# Patient Record
Sex: MALE | Race: WHITE | NOT HISPANIC OR LATINO | Employment: FULL TIME | URBAN - METROPOLITAN AREA
[De-identification: names, ages, dates, MRNs, and addresses within clinical notes are randomized per-mention and may not be internally consistent; named-entity substitution may affect disease eponyms.]

---

## 2017-04-05 DIAGNOSIS — C83.30 DIFFUSE LARGE B-CELL LYMPHOMA, UNSPECIFIED BODY REGION (HCC): Primary | ICD-10-CM

## 2017-04-10 ENCOUNTER — OFFICE VISIT (OUTPATIENT)
Dept: ONCOLOGY | Facility: CLINIC | Age: 57
End: 2017-04-10

## 2017-04-10 ENCOUNTER — LAB (OUTPATIENT)
Dept: OTHER | Facility: HOSPITAL | Age: 57
End: 2017-04-10

## 2017-04-10 VITALS
DIASTOLIC BLOOD PRESSURE: 66 MMHG | HEART RATE: 77 BPM | WEIGHT: 205.4 LBS | TEMPERATURE: 97.3 F | SYSTOLIC BLOOD PRESSURE: 105 MMHG | RESPIRATION RATE: 16 BRPM | HEIGHT: 73 IN | OXYGEN SATURATION: 97 % | BODY MASS INDEX: 27.22 KG/M2

## 2017-04-10 DIAGNOSIS — C83.32 DIFFUSE LARGE B-CELL LYMPHOMA OF INTRATHORACIC LYMPH NODES (HCC): Primary | ICD-10-CM

## 2017-04-10 DIAGNOSIS — C83.30 DIFFUSE LARGE B-CELL LYMPHOMA, UNSPECIFIED BODY REGION (HCC): Primary | ICD-10-CM

## 2017-04-10 DIAGNOSIS — Z92.3 HISTORY OF RADIATION THERAPY: ICD-10-CM

## 2017-04-10 LAB
ALBUMIN SERPL-MCNC: 4.8 G/DL (ref 3.5–5.2)
ALBUMIN/GLOB SERPL: 1.8 G/DL
ALP SERPL-CCNC: 73 U/L (ref 39–117)
ALT SERPL W P-5'-P-CCNC: 35 U/L (ref 1–41)
ANION GAP SERPL CALCULATED.3IONS-SCNC: 10.4 MMOL/L
AST SERPL-CCNC: 30 U/L (ref 1–40)
BASOPHILS # BLD AUTO: 0.05 10*3/MM3 (ref 0–0.2)
BASOPHILS NFR BLD AUTO: 0.9 % (ref 0–1.5)
BILIRUB SERPL-MCNC: 0.5 MG/DL (ref 0.1–1.2)
BUN BLD-MCNC: 15 MG/DL (ref 6–20)
BUN/CREAT SERPL: 16 (ref 7–25)
CALCIUM SPEC-SCNC: 9.6 MG/DL (ref 8.6–10.5)
CHLORIDE SERPL-SCNC: 103 MMOL/L (ref 98–107)
CO2 SERPL-SCNC: 27.6 MMOL/L (ref 22–29)
CREAT BLD-MCNC: 0.94 MG/DL (ref 0.76–1.27)
DEPRECATED RDW RBC AUTO: 41.4 FL (ref 37–54)
EOSINOPHIL # BLD AUTO: 0.11 10*3/MM3 (ref 0–0.7)
EOSINOPHIL NFR BLD AUTO: 2 % (ref 0.3–6.2)
ERYTHROCYTE [DISTWIDTH] IN BLOOD BY AUTOMATED COUNT: 11.9 % (ref 11.5–14.5)
GFR SERPL CREATININE-BSD FRML MDRD: 83 ML/MIN/1.73
GLOBULIN UR ELPH-MCNC: 2.6 GM/DL
GLUCOSE BLD-MCNC: 142 MG/DL (ref 65–99)
HCT VFR BLD AUTO: 45 % (ref 40.4–52.2)
HGB BLD-MCNC: 15.3 G/DL (ref 13.7–17.6)
HOLD SPECIMEN: NORMAL
IMM GRANULOCYTES # BLD: 0.02 10*3/MM3 (ref 0–0.03)
IMM GRANULOCYTES NFR BLD: 0.4 % (ref 0–0.5)
LDH SERPL-CCNC: 145 U/L (ref 135–225)
LYMPHOCYTES # BLD AUTO: 1.65 10*3/MM3 (ref 0.9–4.8)
LYMPHOCYTES NFR BLD AUTO: 30.3 % (ref 19.6–45.3)
MCH RBC QN AUTO: 31.8 PG (ref 27–32.7)
MCHC RBC AUTO-ENTMCNC: 34 G/DL (ref 32.6–36.4)
MCV RBC AUTO: 93.6 FL (ref 79.8–96.2)
MONOCYTES # BLD AUTO: 0.48 10*3/MM3 (ref 0.2–1.2)
MONOCYTES NFR BLD AUTO: 8.8 % (ref 5–12)
NEUTROPHILS # BLD AUTO: 3.13 10*3/MM3 (ref 1.9–8.1)
NEUTROPHILS NFR BLD AUTO: 57.6 % (ref 42.7–76)
NRBC BLD MANUAL-RTO: 0 /100 WBC (ref 0–0)
PLATELET # BLD AUTO: 228 10*3/MM3 (ref 140–500)
PMV BLD AUTO: 9.6 FL (ref 6–12)
POTASSIUM BLD-SCNC: 4.1 MMOL/L (ref 3.5–5.2)
PROT SERPL-MCNC: 7.4 G/DL (ref 6–8.5)
RBC # BLD AUTO: 4.81 10*6/MM3 (ref 4.6–6)
SODIUM BLD-SCNC: 141 MMOL/L (ref 136–145)
WBC NRBC COR # BLD: 5.44 10*3/MM3 (ref 4.5–10.7)

## 2017-04-10 PROCEDURE — 80053 COMPREHEN METABOLIC PANEL: CPT | Performed by: INTERNAL MEDICINE

## 2017-04-10 PROCEDURE — 85025 COMPLETE CBC W/AUTO DIFF WBC: CPT | Performed by: INTERNAL MEDICINE

## 2017-04-10 PROCEDURE — 99213 OFFICE O/P EST LOW 20 MIN: CPT | Performed by: INTERNAL MEDICINE

## 2017-04-10 PROCEDURE — 36415 COLL VENOUS BLD VENIPUNCTURE: CPT

## 2017-04-10 PROCEDURE — 83615 LACTATE (LD) (LDH) ENZYME: CPT | Performed by: INTERNAL MEDICINE

## 2017-04-10 NOTE — PROGRESS NOTES
Subjective .     REASONS FOR FOLLOWUP:    Primary mediastinal large B cell non-Hodgkin's lymphoma status post CHOP/Rituxan chemotherapy x six cycles, completed January 2005, with subsequent consolidative mediastinal radiation therapy.      HISTORY OF PRESENT ILLNESS:  The patient is a 57 y.o. year old male who is here for follow-up with the above-mentioned history.    History of Present Illness   patient returns today for an annual follow-up visit with laboratory studies to review.  In the interval, he has had no significant complaints.  He denies any fevers night sweats.  He reports no appetite.  He remains very active.  He denies any chest pain, dyspnea on exertion.    PAST MEDICAL HISTORY:  Hyperlipidemia.     ONCOLOGIC HISTORY:  (History from previous dates can be found in the separate document.)    Current Outpatient Prescriptions on File Prior to Visit   Medication Sig Dispense Refill   • rosuvastatin (CRESTOR) 10 MG tablet Take 10 mg by mouth daily.       No current facility-administered medications on file prior to visit.        ALLERGIES:   No Known Allergies    SOCIAL HISTORY:    MARITAL HISTORY:  , living with spouse. Three children  OCCUPATION:   .  TOBACCO USE:   Has no significant smoking history.  ALCOHOL:   Does not give any significant history of alcohol usage.    FAMILY HISTORY:  Noncontributory.  Patient has one brother. Father is a retired surgeon.          Review of Systems   Constitutional: Negative for activity change, appetite change, fatigue, fever and unexpected weight change.   HENT: Negative for congestion, mouth sores, nosebleeds, sore throat and voice change.    Respiratory: Negative for cough, shortness of breath and wheezing.    Cardiovascular: Negative for chest pain, palpitations and leg swelling.   Gastrointestinal: Negative for abdominal distention, abdominal pain, blood in stool, constipation, diarrhea, nausea and vomiting.   Endocrine: Negative for cold  intolerance and heat intolerance.   Genitourinary: Negative for difficulty urinating, dysuria, frequency and hematuria.   Musculoskeletal: Negative for arthralgias, back pain, joint swelling and myalgias.   Skin: Negative for rash.   Neurological: Negative for dizziness, syncope, weakness, light-headedness, numbness and headaches.   Hematological: Negative for adenopathy. Does not bruise/bleed easily.   Psychiatric/Behavioral: Negative for confusion and sleep disturbance. The patient is not nervous/anxious.          Objective      Vitals:    04/10/17 1000   BP: 105/66   Pulse: 77   Resp: 16   Temp: 97.3 °F (36.3 °C)   SpO2: 97%      Current Status 4/10/2017   ECOG score 0   Pain 0/10    Physical Exam   Constitutional: He is oriented to person, place, and time. He appears well-developed and well-nourished.   HENT:   Mouth/Throat: Oropharynx is clear and moist.   Eyes: Conjunctivae are normal.   Neck: No thyromegaly present.   Cardiovascular: Normal rate and regular rhythm.  Exam reveals no gallop and no friction rub.    No murmur heard.  Pulmonary/Chest: Breath sounds normal. No respiratory distress.   Abdominal: Soft. Bowel sounds are normal. He exhibits no distension. There is no tenderness.   Musculoskeletal: He exhibits no edema.   Lymphadenopathy:        Head (right side): No submandibular adenopathy present.     He has no cervical adenopathy.     He has no axillary adenopathy.        Right: No inguinal and no supraclavicular adenopathy present.        Left: No inguinal and no supraclavicular adenopathy present.   Neurological: He is alert and oriented to person, place, and time. He displays normal reflexes. No cranial nerve deficit. He exhibits normal muscle tone.   Skin: Skin is warm and dry. No rash noted.   Psychiatric: He has a normal mood and affect. His behavior is normal.       RECENT LABS:  Hematology WBC   Date Value Ref Range Status   04/10/2017 5.44 4.50 - 10.70 10*3/mm3 Final     RBC   Date Value  Ref Range Status   04/10/2017 4.81 4.60 - 6.00 10*6/mm3 Final     Hemoglobin   Date Value Ref Range Status   04/10/2017 15.3 13.7 - 17.6 g/dL Final     Hematocrit   Date Value Ref Range Status   04/10/2017 45.0 40.4 - 52.2 % Final     Platelets   Date Value Ref Range Status   04/10/2017 228 140 - 500 10*3/mm3 Final        Lab Results   Component Value Date    GLUCOSE 142 (H) 04/10/2017    BUN 15 04/10/2017    CREATININE 0.94 04/10/2017    EGFRIFNONA 83 04/10/2017    BCR 16.0 04/10/2017    K 4.1 04/10/2017    CO2 27.6 04/10/2017    CALCIUM 9.6 04/10/2017    ALBUMIN 4.80 04/10/2017    LABIL2 1.8 04/10/2017    AST 30 04/10/2017    ALT 35 04/10/2017           Assessment/Plan     1. Primary mediastinal large B cell non-Hodgkin lymphoma: The patient was diagnosed in 2004 and was treated with CHOP/Rituxan x6 cycles and subsequently received consolidative radiation therapy with treatment completed in early 2005. He is therefore 12 years out from completion of therapy. He has had no evidence of recurrence. We did discuss again today the reason for continued annual followup as to monitor for potential late term effects from his treatment, particularly radiation therapy to the chest. He clinically is asymptomatic at this time. We previously discussed changing followup chest x-ray to every other year.  Chest x-ray was performed in 2016 and therefore he will be due for this next year when he returns.  We did discuss the potential increased risk for premature coronary artery disease.  He did undergo a stress test at least 5 or so years ago due to some atypical chest pain and this apparently was a negative study.  We will go ahead and refer him back to cardiology to discuss any potential need for repeat stress test.   2. Health maintenance: The patient did undergo a screening colonoscopy in December 2015 with a negative result.  Follow-up will be anticipated at a 10 year interval.     PLAN:      1. Refer to cardiology for  consideration of need for repeat stress testing due to previous thoracic irradiation.  2. Return here in 1 year for an MD visit with CBC, CMP, LDH, as well as chest x-ray.

## 2017-04-20 ENCOUNTER — OFFICE VISIT (OUTPATIENT)
Dept: CARDIOLOGY | Facility: CLINIC | Age: 57
End: 2017-04-20

## 2017-04-20 VITALS
SYSTOLIC BLOOD PRESSURE: 118 MMHG | BODY MASS INDEX: 27.3 KG/M2 | WEIGHT: 206 LBS | HEART RATE: 75 BPM | HEIGHT: 73 IN | DIASTOLIC BLOOD PRESSURE: 74 MMHG

## 2017-04-20 DIAGNOSIS — R06.02 SHORTNESS OF BREATH: ICD-10-CM

## 2017-04-20 DIAGNOSIS — IMO0002 RADIATION EXPOSURE: Primary | ICD-10-CM

## 2017-04-20 PROCEDURE — 99204 OFFICE O/P NEW MOD 45 MIN: CPT | Performed by: INTERNAL MEDICINE

## 2017-04-20 PROCEDURE — 93000 ELECTROCARDIOGRAM COMPLETE: CPT | Performed by: INTERNAL MEDICINE

## 2017-04-20 RX ORDER — ASPIRIN 81 MG/1
81 TABLET ORAL DAILY
COMMUNITY
End: 2018-05-04

## 2017-04-20 NOTE — PROGRESS NOTES
Date of Office Visit: 2017  Encounter Provider: Daryl Crisostomo MD  Place of Service: [facility] CARDIOLOGY  Patient Name: Franklin Da Silva  :1960    Chief complaint: Prior mediastinal and chest radiation exposure for NHL.    History of Present Illness:    Dear Dr. Nolan:    I had the pleasure of seeing your patient in cardiology office on 2017.  As you well   know, he is a very pleasant 57-year-old male with a past medical history significant for   prior non-Hodgkin's lymphoma of the chest and mediastinum who presents for   evaluation.  The patient was diagnosed with diffuse large B cell non-Hodgkin's   lymphoma in , and underwent chemotherapy.  He subsequently underwent   extensive mediastinal and chest radiation in .  He has been referred for evaluation   based on his prior radiation exposure to his chest.  On direct question, he only has   shortness of breath with heavy exertion, but states that this has not changed.  He   actually works out routinely, without any difficulty.  He has had no chest pain.  He did   have a stress echocardiogram in  which showed no evidence of constriction, and   no significant valvular disease.  His ejection fraction was 50-55% of the time.  His EKG   from today is normal other than a first-degree AV block.    Past Medical History:   Diagnosis Date   • History of radiation therapy     To mediastinum    • Hyperlipidemia    • NHL (non-Hodgkin's lymphoma)     Diffuse large B cell       Past Surgical History:   Procedure Laterality Date   • APPENDECTOMY      As a child   • COLONOSCOPY  2015    Screening, negative/[facility]       Current Outpatient Prescriptions on File Prior to Visit   Medication Sig Dispense Refill   • rosuvastatin (CRESTOR) 10 MG tablet Take 10 mg by mouth daily.       No current facility-administered medications on file prior to visit.      Allergies as of 2017   • (No Known  "Allergies)     Social History     Social History   • Marital status:      Spouse name: Conchita   • Number of children: 3   • Years of education: College     Occupational History   • [employer]     Social History Main Topics   • Smoking status: Never Smoker   • Smokeless tobacco: Never Used   • Alcohol use Yes      Comment: Socially    • Drug use: No   • Sexual activity: Not on file     Other Topics Concern   • Not on file     Social History Narrative     Family History   Problem Relation Age of Onset   • Coronary artery disease Father      Father with CABG in mid 70's   • Heart disease Father      Father with pacemaker        Review of Systems   All other systems reviewed and are negative.    Objective:     Vitals:    04/20/17 0853   BP: 118/74   Pulse: 75   Weight: 206 lb (93.4 kg)   Height: 73\" (185.4 cm)     Body mass index is 27.18 kg/(m^2).    Physical Exam   Constitutional: He is oriented to person, place, and time. He appears well-developed and well-nourished.   HENT:   Head: Normocephalic and atraumatic.   Eyes: Conjunctivae are normal.   Neck: Neck supple.   Cardiovascular: Normal rate and regular rhythm.  Exam reveals no gallop and no friction rub.    No murmur heard.  Pulmonary/Chest: Effort normal and breath sounds normal.   Abdominal: Soft. There is no tenderness.   Musculoskeletal: He exhibits no edema.   Neurological: He is alert and oriented to person, place, and time.   Skin: Skin is warm.   Psychiatric: He has a normal mood and affect. His behavior is normal.     Lab Review:     ECG 12 Lead  Date/Time: 4/20/2017 8:54 AM  Performed by: GAYATRI MOORE  Authorized by: GAYATRI MOORE   Comparison: not compared with previous ECG   Previous ECG: no previous ECG available  Rhythm: sinus rhythm  Rate: normal  BPM: 75  Conduction: 1st degree  Clinical impression: non-specific ECG            Assessment:       Diagnosis Plan   1. Radiation exposure  Adult Transthoracic Echo " Complete   2. Shortness of breath  Adult Transthoracic Echo Complete     Plan:       As noted, the patient did have radiation therapy to his chest and mediastinum for   non-Hodgkin's lymphoma in 2005.  I explained to him that the average time.  For   radiation-induced heart disease to manifest is around 20 years post radiation.  I   did also discuss with him the possibilities of radiation induced heart disease,   including valvular disease, restrictive cardiomyopathy, constrictive pericarditis,   and premature coronary artery disease.  He is not having any significant exertional   symptoms other than shortness of breath with heavy exertion.  He has had no   chest pain.  I do not feel that he needs an ischemic work-up at this time.  However,   I would recommend checking an echocardiogram to evaluate his ventricular   function, diastolic function, and valvular anatomy.  I do also suggested he get an   echocardiogram every 2-3 years to reevaluate this (at least until he is 20 years   out from his radiation).  Further plans will be made pending the results of the   echocardiogram.

## 2017-05-01 ENCOUNTER — HOSPITAL ENCOUNTER (OUTPATIENT)
Dept: CARDIOLOGY | Facility: HOSPITAL | Age: 57
Discharge: HOME | End: 2017-05-01
Attending: INTERNAL MEDICINE | Admitting: INTERNAL MEDICINE

## 2017-05-01 VITALS
HEART RATE: 79 BPM | WEIGHT: 200 LBS | BODY MASS INDEX: 27.09 KG/M2 | SYSTOLIC BLOOD PRESSURE: 114 MMHG | DIASTOLIC BLOOD PRESSURE: 52 MMHG | HEIGHT: 72 IN

## 2017-05-01 DIAGNOSIS — R06.02 SHORTNESS OF BREATH: ICD-10-CM

## 2017-05-01 DIAGNOSIS — IMO0002 RADIATION EXPOSURE: ICD-10-CM

## 2017-05-01 LAB
BH CV ECHO MEAS - ACS: 2 CM
BH CV ECHO MEAS - AO MAX PG (FULL): 5.6 MMHG
BH CV ECHO MEAS - AO MAX PG: 8.8 MMHG
BH CV ECHO MEAS - AO MEAN PG (FULL): 2.4 MMHG
BH CV ECHO MEAS - AO MEAN PG: 4.1 MMHG
BH CV ECHO MEAS - AO ROOT AREA (BSA CORRECTED): 1.7
BH CV ECHO MEAS - AO ROOT AREA: 10.2 CM^2
BH CV ECHO MEAS - AO ROOT DIAM: 3.6 CM
BH CV ECHO MEAS - AO V2 MAX: 148 CM/SEC
BH CV ECHO MEAS - AO V2 MEAN: 92.6 CM/SEC
BH CV ECHO MEAS - AO V2 VTI: 28.4 CM
BH CV ECHO MEAS - AVA(I,A): 2.8 CM^2
BH CV ECHO MEAS - AVA(I,D): 2.8 CM^2
BH CV ECHO MEAS - AVA(V,A): 2.6 CM^2
BH CV ECHO MEAS - AVA(V,D): 2.6 CM^2
BH CV ECHO MEAS - BSA(HAYCOCK): 2.2 M^2
BH CV ECHO MEAS - BSA: 2.1 M^2
BH CV ECHO MEAS - BZI_BMI: 27.1 KILOGRAMS/M^2
BH CV ECHO MEAS - BZI_METRIC_HEIGHT: 182.9 CM
BH CV ECHO MEAS - BZI_METRIC_WEIGHT: 90.7 KG
BH CV ECHO MEAS - CONTRAST EF (2CH): 53.1 ML/M^2
BH CV ECHO MEAS - CONTRAST EF 4CH: 53.2 ML/M^2
BH CV ECHO MEAS - EDV(MOD-SP2): 96 ML
BH CV ECHO MEAS - EDV(MOD-SP4): 94 ML
BH CV ECHO MEAS - EDV(TEICH): 103.6 ML
BH CV ECHO MEAS - EF(CUBED): 76.4 %
BH CV ECHO MEAS - EF(MOD-SP2): 53.1 %
BH CV ECHO MEAS - EF(MOD-SP4): 53.2 %
BH CV ECHO MEAS - EF(TEICH): 68.4 %
BH CV ECHO MEAS - ESV(MOD-SP2): 45 ML
BH CV ECHO MEAS - ESV(MOD-SP4): 44 ML
BH CV ECHO MEAS - ESV(TEICH): 32.7 ML
BH CV ECHO MEAS - FS: 38.2 %
BH CV ECHO MEAS - IVS/LVPW: 0.81
BH CV ECHO MEAS - IVSD: 0.85 CM
BH CV ECHO MEAS - LAT PEAK E' VEL: 12 CM/SEC
BH CV ECHO MEAS - LV DIASTOLIC VOL/BSA (35-75): 44.1 ML/M^2
BH CV ECHO MEAS - LV MASS(C)D: 152.8 GRAMS
BH CV ECHO MEAS - LV MASS(C)DI: 71.7 GRAMS/M^2
BH CV ECHO MEAS - LV MAX PG: 3.2 MMHG
BH CV ECHO MEAS - LV MEAN PG: 1.7 MMHG
BH CV ECHO MEAS - LV SYSTOLIC VOL/BSA (12-30): 20.7 ML/M^2
BH CV ECHO MEAS - LV V1 MAX: 88.8 CM/SEC
BH CV ECHO MEAS - LV V1 MEAN: 61.5 CM/SEC
BH CV ECHO MEAS - LV V1 VTI: 18.4 CM
BH CV ECHO MEAS - LVIDD: 4.7 CM
BH CV ECHO MEAS - LVIDS: 2.9 CM
BH CV ECHO MEAS - LVLD AP2: 9.1 CM
BH CV ECHO MEAS - LVLD AP4: 9 CM
BH CV ECHO MEAS - LVLS AP2: 7.6 CM
BH CV ECHO MEAS - LVLS AP4: 7.4 CM
BH CV ECHO MEAS - LVOT AREA (M): 4.5 CM^2
BH CV ECHO MEAS - LVOT AREA: 4.3 CM^2
BH CV ECHO MEAS - LVOT DIAM: 2.4 CM
BH CV ECHO MEAS - LVPWD: 1 CM
BH CV ECHO MEAS - MED PEAK E' VEL: 9 CM/SEC
BH CV ECHO MEAS - MV A DUR: 0.15 SEC
BH CV ECHO MEAS - MV A MAX VEL: 66 CM/SEC
BH CV ECHO MEAS - MV DEC SLOPE: 298.8 CM/SEC^2
BH CV ECHO MEAS - MV DEC TIME: 0.26 SEC
BH CV ECHO MEAS - MV E MAX VEL: 79.1 CM/SEC
BH CV ECHO MEAS - MV E/A: 1.2
BH CV ECHO MEAS - MV MAX PG: 2.7 MMHG
BH CV ECHO MEAS - MV MEAN PG: 1.7 MMHG
BH CV ECHO MEAS - MV P1/2T MAX VEL: 81.5 CM/SEC
BH CV ECHO MEAS - MV P1/2T: 79.9 MSEC
BH CV ECHO MEAS - MV V2 MAX: 81.4 CM/SEC
BH CV ECHO MEAS - MV V2 MEAN: 61.8 CM/SEC
BH CV ECHO MEAS - MV V2 VTI: 25.2 CM
BH CV ECHO MEAS - MVA P1/2T LCG: 2.7 CM^2
BH CV ECHO MEAS - MVA(P1/2T): 2.8 CM^2
BH CV ECHO MEAS - MVA(VTI): 3.2 CM^2
BH CV ECHO MEAS - PA ACC TIME: 0.14 SEC
BH CV ECHO MEAS - PA MAX PG (FULL): 1.3 MMHG
BH CV ECHO MEAS - PA MAX PG: 3.2 MMHG
BH CV ECHO MEAS - PA PR(ACCEL): 15.6 MMHG
BH CV ECHO MEAS - PA V2 MAX: 89.6 CM/SEC
BH CV ECHO MEAS - PULM DIAS VEL: 47.1 CM/SEC
BH CV ECHO MEAS - PULM S/D: 0.98
BH CV ECHO MEAS - PULM SYS VEL: 46.1 CM/SEC
BH CV ECHO MEAS - PVA(V,A): 1.9 CM^2
BH CV ECHO MEAS - PVA(V,D): 1.9 CM^2
BH CV ECHO MEAS - QP/QS: 0.46
BH CV ECHO MEAS - RAP SYSTOLE: 3 MMHG
BH CV ECHO MEAS - RV MAX PG: 1.9 MMHG
BH CV ECHO MEAS - RV MEAN PG: 1.1 MMHG
BH CV ECHO MEAS - RV V1 MAX: 68.9 CM/SEC
BH CV ECHO MEAS - RV V1 MEAN: 47.7 CM/SEC
BH CV ECHO MEAS - RV V1 VTI: 14.6 CM
BH CV ECHO MEAS - RVOT AREA: 2.5 CM^2
BH CV ECHO MEAS - RVOT DIAM: 1.8 CM
BH CV ECHO MEAS - SI(AO): 136.4 ML/M^2
BH CV ECHO MEAS - SI(CUBED): 37.8 ML/M^2
BH CV ECHO MEAS - SI(LVOT): 37.6 ML/M^2
BH CV ECHO MEAS - SI(MOD-SP2): 23.9 ML/M^2
BH CV ECHO MEAS - SI(MOD-SP4): 23.5 ML/M^2
BH CV ECHO MEAS - SI(TEICH): 33.3 ML/M^2
BH CV ECHO MEAS - SUP REN AO DIAM: 1.9 CM
BH CV ECHO MEAS - SV(AO): 290.5 ML
BH CV ECHO MEAS - SV(CUBED): 80.6 ML
BH CV ECHO MEAS - SV(LVOT): 80.1 ML
BH CV ECHO MEAS - SV(MOD-SP2): 51 ML
BH CV ECHO MEAS - SV(MOD-SP4): 50 ML
BH CV ECHO MEAS - SV(RVOT): 36.8 ML
BH CV ECHO MEAS - SV(TEICH): 70.9 ML
BH CV ECHO MEAS - TAPSE (>1.6): 2.3 CM2
BH CV XLRA - RV BASE: 2.6 CM
BH CV XLRA - TDI S': 11 CM/SEC
E/E' RATIO: 8
LEFT ATRIUM VOLUME INDEX: 13 ML/M2
LEFT ATRIUM VOLUME: 27 CM3
SINUS: 3.5 CM
STJ: 2.8 CM

## 2017-05-01 PROCEDURE — 93306 TTE W/DOPPLER COMPLETE: CPT

## 2017-05-01 PROCEDURE — 93306 TTE W/DOPPLER COMPLETE: CPT | Performed by: INTERNAL MEDICINE

## 2018-05-04 ENCOUNTER — OFFICE VISIT (OUTPATIENT)
Dept: ONCOLOGY | Facility: CLINIC | Age: 58
End: 2018-05-04

## 2018-05-04 ENCOUNTER — APPOINTMENT (OUTPATIENT)
Dept: GENERAL RADIOLOGY | Facility: HOSPITAL | Age: 58
End: 2018-05-04

## 2018-05-04 ENCOUNTER — LAB (OUTPATIENT)
Dept: LAB | Facility: HOSPITAL | Age: 58
End: 2018-05-04

## 2018-05-04 VITALS
HEART RATE: 77 BPM | DIASTOLIC BLOOD PRESSURE: 70 MMHG | TEMPERATURE: 97.7 F | BODY MASS INDEX: 27.89 KG/M2 | SYSTOLIC BLOOD PRESSURE: 138 MMHG | OXYGEN SATURATION: 98 % | RESPIRATION RATE: 16 BRPM | WEIGHT: 210.4 LBS | HEIGHT: 73 IN

## 2018-05-04 DIAGNOSIS — Z92.3 HISTORY OF RADIATION THERAPY: ICD-10-CM

## 2018-05-04 DIAGNOSIS — C83.32 DIFFUSE LARGE B-CELL LYMPHOMA OF INTRATHORACIC LYMPH NODES (HCC): Primary | ICD-10-CM

## 2018-05-04 DIAGNOSIS — C83.32 DIFFUSE LARGE B-CELL LYMPHOMA OF INTRATHORACIC LYMPH NODES (HCC): ICD-10-CM

## 2018-05-04 LAB
ALBUMIN SERPL-MCNC: 4.6 G/DL (ref 3.5–5.2)
ALBUMIN/GLOB SERPL: 1.7 G/DL (ref 1.1–2.4)
ALP SERPL-CCNC: 73 U/L (ref 38–116)
ALT SERPL W P-5'-P-CCNC: 23 U/L (ref 0–41)
ANION GAP SERPL CALCULATED.3IONS-SCNC: 11.7 MMOL/L
AST SERPL-CCNC: 24 U/L (ref 0–40)
BASOPHILS # BLD AUTO: 0.02 10*3/MM3 (ref 0–0.1)
BASOPHILS NFR BLD AUTO: 0.4 % (ref 0–1.1)
BILIRUB SERPL-MCNC: 0.2 MG/DL (ref 0.1–1.2)
BUN BLD-MCNC: 13 MG/DL (ref 6–20)
BUN/CREAT SERPL: 14.8 (ref 7.3–30)
CALCIUM SPEC-SCNC: 9.2 MG/DL (ref 8.5–10.2)
CHLORIDE SERPL-SCNC: 101 MMOL/L (ref 98–107)
CO2 SERPL-SCNC: 26.3 MMOL/L (ref 22–29)
CREAT BLD-MCNC: 0.88 MG/DL (ref 0.7–1.3)
DEPRECATED RDW RBC AUTO: 40.3 FL (ref 37–49)
EOSINOPHIL # BLD AUTO: 0.1 10*3/MM3 (ref 0–0.36)
EOSINOPHIL NFR BLD AUTO: 1.9 % (ref 1–5)
ERYTHROCYTE [DISTWIDTH] IN BLOOD BY AUTOMATED COUNT: 11.7 % (ref 11.7–14.5)
GFR SERPL CREATININE-BSD FRML MDRD: 90 ML/MIN/1.73
GLOBULIN UR ELPH-MCNC: 2.7 GM/DL (ref 1.8–3.5)
GLUCOSE BLD-MCNC: 148 MG/DL (ref 74–124)
HCT VFR BLD AUTO: 41.7 % (ref 40–49)
HGB BLD-MCNC: 14.1 G/DL (ref 13.5–16.5)
IMM GRANULOCYTES # BLD: 0.01 10*3/MM3 (ref 0–0.03)
IMM GRANULOCYTES NFR BLD: 0.2 % (ref 0–0.5)
LDH SERPL-CCNC: 154 U/L (ref 99–259)
LYMPHOCYTES # BLD AUTO: 1.35 10*3/MM3 (ref 1–3.5)
LYMPHOCYTES NFR BLD AUTO: 25.3 % (ref 20–49)
MCH RBC QN AUTO: 31.7 PG (ref 27–33)
MCHC RBC AUTO-ENTMCNC: 33.8 G/DL (ref 32–35)
MCV RBC AUTO: 93.7 FL (ref 83–97)
MONOCYTES # BLD AUTO: 0.4 10*3/MM3 (ref 0.25–0.8)
MONOCYTES NFR BLD AUTO: 7.5 % (ref 4–12)
NEUTROPHILS # BLD AUTO: 3.46 10*3/MM3 (ref 1.5–7)
NEUTROPHILS NFR BLD AUTO: 64.7 % (ref 39–75)
NRBC BLD MANUAL-RTO: 0 /100 WBC (ref 0–0)
PLATELET # BLD AUTO: 220 10*3/MM3 (ref 150–375)
PMV BLD AUTO: 9.3 FL (ref 8.9–12.1)
POTASSIUM BLD-SCNC: 3.8 MMOL/L (ref 3.5–4.7)
PROT SERPL-MCNC: 7.3 G/DL (ref 6.3–8)
RBC # BLD AUTO: 4.45 10*6/MM3 (ref 4.3–5.5)
SODIUM BLD-SCNC: 139 MMOL/L (ref 134–145)
WBC NRBC COR # BLD: 5.34 10*3/MM3 (ref 4–10)

## 2018-05-04 PROCEDURE — 36415 COLL VENOUS BLD VENIPUNCTURE: CPT | Performed by: INTERNAL MEDICINE

## 2018-05-04 PROCEDURE — 71046 X-RAY EXAM CHEST 2 VIEWS: CPT

## 2018-05-04 PROCEDURE — 80053 COMPREHEN METABOLIC PANEL: CPT | Performed by: INTERNAL MEDICINE

## 2018-05-04 PROCEDURE — 85025 COMPLETE CBC W/AUTO DIFF WBC: CPT | Performed by: INTERNAL MEDICINE

## 2018-05-04 PROCEDURE — 99214 OFFICE O/P EST MOD 30 MIN: CPT | Performed by: INTERNAL MEDICINE

## 2018-05-04 PROCEDURE — 83615 LACTATE (LD) (LDH) ENZYME: CPT | Performed by: INTERNAL MEDICINE

## 2018-05-04 RX ORDER — ASCORBIC ACID 500 MG
500 TABLET ORAL DAILY
COMMUNITY
End: 2021-09-23

## 2018-05-04 NOTE — PROGRESS NOTES
Subjective .     REASONS FOR FOLLOWUP:    Primary mediastinal large B cell non-Hodgkin's lymphoma status post CHOP/Rituxan chemotherapy x six cycles, completed January 2005, with subsequent consolidative mediastinal radiation therapy.      HISTORY OF PRESENT ILLNESS:  The patient is a 58 y.o. year old male who is here for follow-up with the above-mentioned history.    History of Present Illness   patient returns today for an annual follow-up visit with laboratory studies and chest Xray to review.  In the interval, the patient was seen by Dr. Crisostomo in cardiology in regards to risk for premature coronary artery disease on 4/20/17.  It was felt that he did not require ischemic evaluation given his asymptomatic status and time frame out from radiation therapy.  He did however undergo echocardiogram 5/1/17 showing an ejection fraction of 53.2% and no evidence of valvular abnormalities.  It was recommended for him to undergo echocardiograms every 2-3 years.  In the interval, he has no complaints.    PAST MEDICAL HISTORY:  Hyperlipidemia.   Past Medical History:   Diagnosis Date   • History of radiation therapy 2005    To mediastinum    • Hyperlipidemia    • NHL (non-Hodgkin's lymphoma) 2004    Diffuse large B cell     Past Surgical History:   Procedure Laterality Date   • APPENDECTOMY      As a child   • COLONOSCOPY  12/2015    Screening, negative/[facility]     ONCOLOGIC HISTORY:  (History from previous dates can be found in the separate document.)    Current Outpatient Prescriptions on File Prior to Visit   Medication Sig Dispense Refill   • Multiple Vitamins-Minerals (MULTIVITAMIN & MINERAL PO) Take  by mouth.     • rosuvastatin (CRESTOR) 10 MG tablet Take 10 mg by mouth daily.     • [DISCONTINUED] aspirin 81 MG EC tablet Take 81 mg by mouth Daily.       No current facility-administered medications on file prior to visit.        ALLERGIES:   No Known Allergies    SOCIAL HISTORY:    MARITAL HISTORY:   , living with spouse. Three children  OCCUPATION:   .  TOBACCO USE:   Has no significant smoking history.  ALCOHOL:   Does not give any significant history of alcohol usage.  Social History     Social History   • Marital status:      Spouse name: Conchita   • Number of children: 3   • Years of education: College     Occupational History   • [employer]     Social History Main Topics   • Smoking status: Never Smoker   • Smokeless tobacco: Never Used   • Alcohol use Yes      Comment: Socially    • Drug use: No   • Sexual activity: Not on file     Other Topics Concern   • Not on file     Social History Narrative   • No narrative on file         FAMILY HISTORY:  Noncontributory.  Patient has one brother. Father is a retired surgeon.   Family History   Problem Relation Age of Onset   • Coronary artery disease Father      Father with CABG in mid 70's   • Heart disease Father      Father with pacemaker          Review of Systems   Constitutional: Negative for activity change, appetite change, fatigue, fever and unexpected weight change.   HENT: Negative for congestion, mouth sores, nosebleeds, sore throat and voice change.    Respiratory: Negative for cough, shortness of breath and wheezing.    Cardiovascular: Negative for chest pain, palpitations and leg swelling.   Gastrointestinal: Negative for abdominal distention, abdominal pain, blood in stool, constipation, diarrhea, nausea and vomiting.   Endocrine: Negative for cold intolerance and heat intolerance.   Genitourinary: Negative for difficulty urinating, dysuria, frequency and hematuria.   Musculoskeletal: Negative for arthralgias, back pain, joint swelling and myalgias.   Skin: Negative for rash.   Neurological: Negative for dizziness, syncope, weakness, light-headedness, numbness and headaches.   Hematological: Negative for adenopathy. Does not bruise/bleed easily.   Psychiatric/Behavioral: Negative for confusion and sleep  disturbance. The patient is not nervous/anxious.          Objective      Vitals:    05/04/18 1337   BP: 138/70   Pulse: 77   Resp: 16   Temp: 97.7 °F (36.5 °C)   SpO2: 98%      Current Status 5/4/2018   ECOG score 0   Pain 0/10    Physical Exam   Constitutional: He is oriented to person, place, and time. He appears well-developed and well-nourished.   HENT:   Mouth/Throat: Oropharynx is clear and moist.   Eyes: Conjunctivae are normal.   Neck: No thyromegaly present.   Cardiovascular: Normal rate and regular rhythm.  Exam reveals no gallop and no friction rub.    No murmur heard.  Pulmonary/Chest: Breath sounds normal. No respiratory distress.   Abdominal: Soft. Bowel sounds are normal. He exhibits no distension. There is no tenderness.   Musculoskeletal: He exhibits no edema.   Lymphadenopathy:        Head (right side): No submandibular adenopathy present.     He has no cervical adenopathy.     He has no axillary adenopathy.        Right: No inguinal and no supraclavicular adenopathy present.        Left: No inguinal and no supraclavicular adenopathy present.   Neurological: He is alert and oriented to person, place, and time. He displays normal reflexes. No cranial nerve deficit. He exhibits normal muscle tone.   Skin: Skin is warm and dry. No rash noted.   Psychiatric: He has a normal mood and affect. His behavior is normal.       RECENT LABS:  Hematology WBC   Date Value Ref Range Status   05/04/2018 5.34 4.00 - 10.00 10*3/mm3 Final     RBC   Date Value Ref Range Status   05/04/2018 4.45 4.30 - 5.50 10*6/mm3 Final     Hemoglobin   Date Value Ref Range Status   05/04/2018 14.1 13.5 - 16.5 g/dL Final     Hematocrit   Date Value Ref Range Status   05/04/2018 41.7 40.0 - 49.0 % Final     Platelets   Date Value Ref Range Status   05/04/2018 220 150 - 375 10*3/mm3 Final        Lab Results   Component Value Date    GLUCOSE 148 (H) 05/04/2018    BUN 13 05/04/2018    CREATININE 0.88 05/04/2018    EGFRIFNONA 90 05/04/2018     BCR 14.8 05/04/2018    K 3.8 05/04/2018    CO2 26.3 05/04/2018    CALCIUM 9.2 05/04/2018    ALBUMIN 4.60 05/04/2018    LABIL2 1.7 05/04/2018    AST 24 05/04/2018    ALT 23 05/04/2018         Chest x-ray today: I did review the chest x-ray images in the computer today.  FINDINGS: The lungs are well-expanded and clear except for some minimal  vague fibrotic change along both mediastinal contours most consistent  with previous radiation fibrosis. This is unchanged from 04/15/2016. The  heart size is normal. There is no evidence of hilar adenopathy.    Assessment/Plan     1. Primary mediastinal large B cell non-Hodgkin lymphoma: The patient was diagnosed in 2004 and was treated with CHOP/Rituxan x6 cycles and subsequently received consolidative radiation therapy with treatment completed in early 2005.  He has had no evidence of recurrence.  At his visit in 2017, we did discuss again potential increased risk for premature coronary artery disease.  He did undergo a stress test at least 5 or more years ago due to some atypical chest pain and this apparently was a negative study.  Was referred back to cardiology Dr. Crisostomo last year and underwent echocardiogram on 5/1/17 showing an ejection fraction of 53.2% and no valvular abnormalities.  It was recommended for him to continue with every 2-3 year repeat echocardiogram studies to evaluate for underlying structural/valvular abnormalities from prior radiation/fibrosis.  He is asymptomatic in regards to coronary artery disease and no further routine follow-up ischemic evaluation was requested.  We are performing chest x-ray on a every other year basis and the study from today is unchanged.  He will return again in 1 year for a visit with labs to review.  2. Health maintenance: The patient did undergo a screening colonoscopy in December 2015 with a negative result.  Follow-up will be anticipated at a 10 year interval.   3. Hyperglycemia: This is a relatively new  finding was addressed for the first time today.  Blood sugar last year was 142 and postprandial blood sugar today is 148.  I did ask him to review this with his primary care physician when he returns there in the next few months consider whether hemoglobin A1c should be performed.  He expressed understanding.    PLAN:      1. The patient will follow up with his primary care physician regarding recent elevation in blood glucose.  2. Return here in 1 year for an MD visit with CBC, CMP.

## 2019-05-20 ENCOUNTER — LAB (OUTPATIENT)
Dept: OTHER | Facility: HOSPITAL | Age: 59
End: 2019-05-20

## 2019-05-20 ENCOUNTER — OFFICE VISIT (OUTPATIENT)
Dept: ONCOLOGY | Facility: CLINIC | Age: 59
End: 2019-05-20

## 2019-05-20 VITALS
BODY MASS INDEX: 28.69 KG/M2 | TEMPERATURE: 97.7 F | RESPIRATION RATE: 16 BRPM | HEART RATE: 75 BPM | DIASTOLIC BLOOD PRESSURE: 82 MMHG | SYSTOLIC BLOOD PRESSURE: 126 MMHG | WEIGHT: 211.8 LBS | OXYGEN SATURATION: 97 % | HEIGHT: 72 IN

## 2019-05-20 DIAGNOSIS — C83.32 DIFFUSE LARGE B-CELL LYMPHOMA OF INTRATHORACIC LYMPH NODES (HCC): Primary | ICD-10-CM

## 2019-05-20 DIAGNOSIS — Z92.3 HISTORY OF RADIATION THERAPY: ICD-10-CM

## 2019-05-20 LAB
ALBUMIN SERPL-MCNC: 4.6 G/DL (ref 3.5–5.2)
ALBUMIN/GLOB SERPL: 1.8 G/DL
ALP SERPL-CCNC: 79 U/L (ref 39–117)
ALT SERPL W P-5'-P-CCNC: 29 U/L (ref 1–41)
ANION GAP SERPL CALCULATED.3IONS-SCNC: 10.2 MMOL/L
AST SERPL-CCNC: 28 U/L (ref 1–40)
BASOPHILS # BLD AUTO: 0.03 10*3/MM3 (ref 0–0.2)
BASOPHILS NFR BLD AUTO: 0.5 % (ref 0–1.5)
BILIRUB SERPL-MCNC: 0.2 MG/DL (ref 0.1–1.2)
BUN BLD-MCNC: 15 MG/DL (ref 6–20)
BUN/CREAT SERPL: 14.4 (ref 7–25)
CALCIUM SPEC-SCNC: 9.4 MG/DL (ref 8.6–10.5)
CHLORIDE SERPL-SCNC: 107 MMOL/L (ref 98–107)
CO2 SERPL-SCNC: 28.8 MMOL/L (ref 22–29)
CREAT BLD-MCNC: 1.04 MG/DL (ref 0.76–1.27)
DEPRECATED RDW RBC AUTO: 43 FL (ref 37–54)
EOSINOPHIL # BLD AUTO: 0.05 10*3/MM3 (ref 0–0.4)
EOSINOPHIL NFR BLD AUTO: 0.9 % (ref 0.3–6.2)
ERYTHROCYTE [DISTWIDTH] IN BLOOD BY AUTOMATED COUNT: 12.3 % (ref 12.3–15.4)
GFR SERPL CREATININE-BSD FRML MDRD: 74 ML/MIN/1.73
GLOBULIN UR ELPH-MCNC: 2.5 GM/DL
GLUCOSE BLD-MCNC: 112 MG/DL (ref 65–99)
HCT VFR BLD AUTO: 43.5 % (ref 37.5–51)
HGB BLD-MCNC: 14.5 G/DL (ref 13–17.7)
IMM GRANULOCYTES # BLD AUTO: 0.01 10*3/MM3 (ref 0–0.05)
IMM GRANULOCYTES NFR BLD AUTO: 0.2 % (ref 0–0.5)
LYMPHOCYTES # BLD AUTO: 1.63 10*3/MM3 (ref 0.7–3.1)
LYMPHOCYTES NFR BLD AUTO: 29.5 % (ref 19.6–45.3)
MCH RBC QN AUTO: 31.7 PG (ref 26.6–33)
MCHC RBC AUTO-ENTMCNC: 33.3 G/DL (ref 31.5–35.7)
MCV RBC AUTO: 95.2 FL (ref 79–97)
MONOCYTES # BLD AUTO: 0.55 10*3/MM3 (ref 0.1–0.9)
MONOCYTES NFR BLD AUTO: 9.9 % (ref 5–12)
NEUTROPHILS # BLD AUTO: 3.26 10*3/MM3 (ref 1.7–7)
NEUTROPHILS NFR BLD AUTO: 59 % (ref 42.7–76)
NRBC BLD AUTO-RTO: 0 /100 WBC (ref 0–0.2)
PLATELET # BLD AUTO: 215 10*3/MM3 (ref 140–450)
PMV BLD AUTO: 9.7 FL (ref 6–12)
POTASSIUM BLD-SCNC: 4.5 MMOL/L (ref 3.5–5.2)
PROT SERPL-MCNC: 7.1 G/DL (ref 6–8.5)
RBC # BLD AUTO: 4.57 10*6/MM3 (ref 4.14–5.8)
SODIUM BLD-SCNC: 146 MMOL/L (ref 136–145)
WBC NRBC COR # BLD: 5.53 10*3/MM3 (ref 3.4–10.8)

## 2019-05-20 PROCEDURE — 36415 COLL VENOUS BLD VENIPUNCTURE: CPT

## 2019-05-20 PROCEDURE — 80053 COMPREHEN METABOLIC PANEL: CPT | Performed by: INTERNAL MEDICINE

## 2019-05-20 PROCEDURE — 85025 COMPLETE CBC W/AUTO DIFF WBC: CPT | Performed by: INTERNAL MEDICINE

## 2019-05-20 PROCEDURE — 99213 OFFICE O/P EST LOW 20 MIN: CPT | Performed by: INTERNAL MEDICINE

## 2019-05-20 NOTE — PROGRESS NOTES
Subjective .     REASONS FOR FOLLOWUP:    Primary mediastinal large B cell non-Hodgkin's lymphoma status post CHOP/Rituxan chemotherapy x six cycles, completed January 2005, with subsequent consolidative mediastinal radiation therapy.      HISTORY OF PRESENT ILLNESS:  The patient is a 59 y.o. year old male who is here for follow-up with the above-mentioned history.    History of Present Illness   patient returns today for an annual follow-up visit with laboratory studies to review.  Due to his prior thoracic irradiation, we are pursuing chest x-rays every other year with study performed 1 year ago.  There was also discussion with cardiology regarding interval echocardiogram monitoring every 2 to 3 years and this was last performed in May 2017.  This past year, the patient is done quite well.  He does note a small nodule that he noted about a year ago around his left inner upper arm.  It is associated with his muscle.  It is nontender and mobile.  He does not feel that it is changed over time.  He reports normal appetite, remains active.  He denies any fevers no night sweats.    PAST MEDICAL HISTORY:  Hyperlipidemia.   Past Medical History:   Diagnosis Date   • History of radiation therapy 2005    To mediastinum    • Hyperlipidemia    • NHL (non-Hodgkin's lymphoma) (CMS/HCC) 2004    Diffuse large B cell     Past Surgical History:   Procedure Laterality Date   • APPENDECTOMY      As a child   • COLONOSCOPY  12/2015    Screening, negative/[facility] [place]     ONCOLOGIC HISTORY:  (History from previous dates can be found in the separate document.)    Current Outpatient Medications on File Prior to Visit   Medication Sig Dispense Refill   • Multiple Vitamins-Minerals (MULTIVITAMIN & MINERAL PO) Take  by mouth.     • rosuvastatin (CRESTOR) 10 MG tablet Take 10 mg by mouth daily.     • vitamin C (ASCORBIC ACID) 500 MG tablet Take 500 mg by mouth Daily.       No current facility-administered medications on file prior to  visit.        ALLERGIES:   No Known Allergies    SOCIAL HISTORY:    MARITAL HISTORY:  , living with spouse. Three children  OCCUPATION:   .  TOBACCO USE:   Has no significant smoking history.  ALCOHOL:   Does not give any significant history of alcohol usage.  Social History     Socioeconomic History   • Marital status:      Spouse name: Conchita   • Number of children: 3   • Years of education: College   • Highest education level: Not on file   Occupational History   • Occupation:      Employer: [employer]   Tobacco Use   • Smoking status: Never Smoker   • Smokeless tobacco: Never Used   Substance and Sexual Activity   • Alcohol use: Yes     Comment: Socially    • Drug use: No         FAMILY HISTORY:  Noncontributory.  Patient has one brother. Father is a retired surgeon.   Family History   Problem Relation Age of Onset   • Coronary artery disease Father         Father with CABG in mid 70's   • Heart disease Father         Father with pacemaker          Review of Systems   Constitutional: Negative for activity change, appetite change, fatigue, fever and unexpected weight change.   HENT: Negative for congestion, mouth sores, nosebleeds, sore throat and voice change.    Respiratory: Negative for cough, shortness of breath and wheezing.    Cardiovascular: Negative for chest pain, palpitations and leg swelling.   Gastrointestinal: Negative for abdominal distention, abdominal pain, blood in stool, constipation, diarrhea, nausea and vomiting.   Endocrine: Negative for cold intolerance and heat intolerance.   Genitourinary: Negative for difficulty urinating, dysuria, frequency and hematuria.   Musculoskeletal: Negative for arthralgias, back pain, joint swelling and myalgias.   Skin: Negative for rash.   Neurological: Negative for dizziness, syncope, weakness, light-headedness, numbness and headaches.   Hematological: Negative for adenopathy. Does not bruise/bleed easily.    Psychiatric/Behavioral: Negative for confusion and sleep disturbance. The patient is not nervous/anxious.          Objective      Vitals:    05/20/19 1604   BP: 126/82   Pulse: 75   Resp: 16   Temp: 97.7 °F (36.5 °C)   SpO2: 97%      Current Status 5/20/2019   ECOG score 0   Pain 0/10    Physical Exam   Constitutional: He is oriented to person, place, and time. He appears well-developed and well-nourished.   HENT:   Mouth/Throat: Oropharynx is clear and moist.   Eyes: Conjunctivae are normal.   Neck: No thyromegaly present.   Cardiovascular: Normal rate and regular rhythm. Exam reveals no gallop and no friction rub.   No murmur heard.  Pulmonary/Chest: Breath sounds normal. No respiratory distress.   Abdominal: Soft. Bowel sounds are normal. He exhibits no distension. There is no tenderness.   Musculoskeletal: He exhibits no edema.   There is a small 1 cm mobile firm nodule in the inner upper left arm associated with the medial portion of the triceps muscle, nontender.   Lymphadenopathy:        Head (right side): No submandibular adenopathy present.     He has no cervical adenopathy.     He has no axillary adenopathy.        Right: No inguinal and no supraclavicular adenopathy present.        Left: No inguinal and no supraclavicular adenopathy present.   Neurological: He is alert and oriented to person, place, and time. He displays normal reflexes. No cranial nerve deficit. He exhibits normal muscle tone.   Skin: Skin is warm and dry. No rash noted.   Psychiatric: He has a normal mood and affect. His behavior is normal.       RECENT LABS:  Hematology WBC   Date Value Ref Range Status   05/20/2019 5.53 3.40 - 10.80 10*3/mm3 Final     RBC   Date Value Ref Range Status   05/20/2019 4.57 4.14 - 5.80 10*6/mm3 Final     Hemoglobin   Date Value Ref Range Status   05/20/2019 14.5 13.0 - 17.7 g/dL Final     Hematocrit   Date Value Ref Range Status   05/20/2019 43.5 37.5 - 51.0 % Final     Platelets   Date Value Ref Range  Status   05/20/2019 215 140 - 450 10*3/mm3 Final        Lab Results   Component Value Date    GLUCOSE 112 (H) 05/20/2019    BUN 15 05/20/2019    CREATININE 1.04 05/20/2019    EGFRIFNONA 74 05/20/2019    BCR 14.4 05/20/2019    K 4.5 05/20/2019    CO2 28.8 05/20/2019    CALCIUM 9.4 05/20/2019    ALBUMIN 4.60 05/20/2019    AST 28 05/20/2019    ALT 29 05/20/2019         Assessment/Plan     1. Primary mediastinal large B cell non-Hodgkin lymphoma: The patient was diagnosed in 2004 and was treated with CHOP/Rituxan x6 cycles and subsequently received consolidative radiation therapy with treatment completed in early 2005.  He has had no evidence of recurrence.  At his visit in 2017, we did discuss again potential increased risk for premature coronary artery disease.  He did undergo a stress test sometime around 2012 due to some atypical chest pain and this apparently was a negative study.  He did see cardiology Dr. Crisostomo in 2017 and underwent echocardiogram on 5/1/17 showing an ejection fraction of 53.2% and no valvular abnormalities.  It was recommended for him to continue with every 2-3 year repeat echocardiogram studies to evaluate for underlying structural/valvular abnormalities from prior radiation/fibrosis.  He is asymptomatic in regards to coronary artery disease and no further routine follow-up ischemic evaluation was requested.  We are performing chest x-ray on a every other year basis and the study from 5/4/2018 was unchanged.  Today, the patient returns for an annual visit clinically doing well.  There is a small palpable nodule he has noted in the left biceps region which is 1 cm, firm, mobile, nontender.  He reports it is been present for nearly a year and he has not noticed any change.  The etiology of this is unclear.  I did suggest that we evaluate the area with ultrasound and he agrees.  We will have this performed in the next 1 to 2 weeks and he will call for the report.  Assuming this is negative,  he will return for routine annual follow-up next year and we will plan to recheck chest x-ray at that time.  He will likely require cardiology follow-up for echocardiogram then as well (3-year interval).  2. Health maintenance: The patient did undergo a screening colonoscopy in December 2015 with a negative result.  Follow-up will be anticipated at a 10 year interval.     PLAN:      1. Ultrasound of the left upper arm to evaluate palpable nodule.  This will be performed in the next 1 to 2 weeks and the patient will call for the report.  2. Return here in 1 year for an MD visit with CBC, CMP and chest x-ray.

## 2019-07-10 ENCOUNTER — APPOINTMENT (OUTPATIENT)
Dept: ULTRASOUND IMAGING | Facility: HOSPITAL | Age: 59
End: 2019-07-10

## 2019-07-15 ENCOUNTER — APPOINTMENT (OUTPATIENT)
Dept: ULTRASOUND IMAGING | Facility: HOSPITAL | Age: 59
End: 2019-07-15

## 2019-07-22 ENCOUNTER — HOSPITAL ENCOUNTER (OUTPATIENT)
Dept: ULTRASOUND IMAGING | Facility: HOSPITAL | Age: 59
Discharge: HOME | End: 2019-07-22
Admitting: INTERNAL MEDICINE

## 2019-07-22 DIAGNOSIS — C83.32 DIFFUSE LARGE B-CELL LYMPHOMA OF INTRATHORACIC LYMPH NODES (HCC): ICD-10-CM

## 2019-07-22 DIAGNOSIS — Z92.3 HISTORY OF RADIATION THERAPY: ICD-10-CM

## 2019-07-22 PROCEDURE — 76882 US LMTD JT/FCL EVL NVASC XTR: CPT

## 2019-07-26 ENCOUNTER — TELEPHONE (OUTPATIENT)
Dept: ONCOLOGY | Facility: CLINIC | Age: 59
End: 2019-07-26

## 2019-07-29 ENCOUNTER — TELEPHONE (OUTPATIENT)
Dept: ONCOLOGY | Facility: CLINIC | Age: 59
End: 2019-07-29

## 2019-07-29 NOTE — TELEPHONE ENCOUNTER
----- Message from Dorys He sent at 7/29/2019  8:37 AM EDT -----  [phone]  Calling to ask about test results.  US showed benign lipoma. Verbalized understanding.

## 2020-01-29 ENCOUNTER — TELEPHONE (OUTPATIENT)
Dept: ONCOLOGY | Facility: OTHER | Age: 60
End: 2020-01-29

## 2020-01-29 NOTE — TELEPHONE ENCOUNTER
Dr Nolan's pt.   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    Medical records req was sent over on 12/17    Please fax records asap to :  [phone]     Callback num: [phone]    Thanks

## 2020-08-27 NOTE — PROGRESS NOTES
Subjective .     REASONS FOR FOLLOWUP:    Primary mediastinal large B cell non-Hodgkin's lymphoma status post CHOP/Rituxan chemotherapy x six cycles, completed January 2005, with subsequent consolidative mediastinal radiation therapy.      HISTORY OF PRESENT ILLNESS:  The patient is a 60 y.o. year old male who is here for follow-up with the above-mentioned history.    History of Present Illness   patient returns today for an annual follow-up visit with laboratory studies to review.  Due to his prior thoracic irradiation, we are pursuing chest x-rays every other year with study performed 1 year ago.  There was also discussion with cardiology regarding interval echocardiogram monitoring every 2 to 3 years and this was last performed in May 2017.  This past year, the patient is done quite well.  He does note a small nodule that he noted about a year ago around his left inner upper arm.  It is associated with his muscle.  It is nontender and mobile.  He does not feel that it is changed over time.  He reports normal appetite, remains active.  He denies any fevers no night sweats.    PAST MEDICAL HISTORY:  Hyperlipidemia.   Past Medical History:   Diagnosis Date   • History of radiation therapy 2005    To mediastinum    • Hyperlipidemia    • NHL (non-Hodgkin's lymphoma) (CMS/HCC) 2004    Diffuse large B cell     Past Surgical History:   Procedure Laterality Date   • APPENDECTOMY      As a child   • COLONOSCOPY  12/2015    Screening, negative/[facility] [place]     ONCOLOGIC HISTORY:  (History from previous dates can be found in the separate document.)    Current Outpatient Medications on File Prior to Visit   Medication Sig Dispense Refill   • Multiple Vitamins-Minerals (MULTIVITAMIN & MINERAL PO) Take  by mouth.     • rosuvastatin (CRESTOR) 10 MG tablet Take 10 mg by mouth daily.     • vitamin C (ASCORBIC ACID) 500 MG tablet Take 500 mg by mouth Daily.       No current facility-administered medications on file prior to  visit.        ALLERGIES:   No Known Allergies    SOCIAL HISTORY:    MARITAL HISTORY:  , living with spouse. Three children  OCCUPATION:   .  TOBACCO USE:   Has no significant smoking history.  ALCOHOL:   Does not give any significant history of alcohol usage.  Social History     Socioeconomic History   • Marital status:      Spouse name: Conchita   • Number of children: 3   • Years of education: College   • Highest education level: Not on file   Occupational History   • Occupation:      Employer: [employer]   Tobacco Use   • Smoking status: Never Smoker   • Smokeless tobacco: Never Used   Substance and Sexual Activity   • Alcohol use: Yes     Comment: Socially    • Drug use: No         FAMILY HISTORY:  Noncontributory.  Patient has one brother. Father is a retired surgeon.   Family History   Problem Relation Age of Onset   • Coronary artery disease Father         Father with CABG in mid 70's   • Heart disease Father         Father with pacemaker          Review of Systems   Constitutional: Negative for activity change, appetite change, fatigue, fever and unexpected weight change.   HENT: Negative for congestion, mouth sores, nosebleeds, sore throat and voice change.    Respiratory: Negative for cough, shortness of breath and wheezing.    Cardiovascular: Negative for chest pain, palpitations and leg swelling.   Gastrointestinal: Negative for abdominal distention, abdominal pain, blood in stool, constipation, diarrhea, nausea and vomiting.   Endocrine: Negative for cold intolerance and heat intolerance.   Genitourinary: Negative for difficulty urinating, dysuria, frequency and hematuria.   Musculoskeletal: Negative for arthralgias, back pain, joint swelling and myalgias.   Skin: Negative for rash.   Neurological: Negative for dizziness, syncope, weakness, light-headedness, numbness and headaches.   Hematological: Negative for adenopathy. Does not bruise/bleed easily.    Psychiatric/Behavioral: Negative for confusion and sleep disturbance. The patient is not nervous/anxious.          Objective      There were no vitals filed for this visit.   Current Status 5/20/2019   ECOG score 0   Pain 0/10    Physical Exam   Constitutional: He is oriented to person, place, and time. He appears well-developed and well-nourished.   HENT:   Mouth/Throat: Oropharynx is clear and moist.   Eyes: Conjunctivae are normal.   Neck: No thyromegaly present.   Cardiovascular: Normal rate and regular rhythm. Exam reveals no gallop and no friction rub.   No murmur heard.  Pulmonary/Chest: Breath sounds normal. No respiratory distress.   Abdominal: Soft. Bowel sounds are normal. He exhibits no distension. There is no tenderness.   Musculoskeletal: He exhibits no edema.   There is a small 1 cm mobile firm nodule in the inner upper left arm associated with the medial portion of the triceps muscle, nontender.   Lymphadenopathy:        Head (right side): No submandibular adenopathy present.     He has no cervical adenopathy.     He has no axillary adenopathy.        Right: No inguinal and no supraclavicular adenopathy present.        Left: No inguinal and no supraclavicular adenopathy present.   Neurological: He is alert and oriented to person, place, and time. He displays normal reflexes. No cranial nerve deficit. He exhibits normal muscle tone.   Skin: Skin is warm and dry. No rash noted.   Psychiatric: He has a normal mood and affect. His behavior is normal.       RECENT LABS:  Hematology WBC   Date Value Ref Range Status   05/20/2019 5.53 3.40 - 10.80 10*3/mm3 Final     RBC   Date Value Ref Range Status   05/20/2019 4.57 4.14 - 5.80 10*6/mm3 Final     Hemoglobin   Date Value Ref Range Status   05/20/2019 14.5 13.0 - 17.7 g/dL Final     Hematocrit   Date Value Ref Range Status   05/20/2019 43.5 37.5 - 51.0 % Final     Platelets   Date Value Ref Range Status   05/20/2019 215 140 - 450 10*3/mm3 Final        Lab  Results   Component Value Date    GLUCOSE 112 (H) 05/20/2019    BUN 15 05/20/2019    CREATININE 1.04 05/20/2019    EGFRIFNONA 74 05/20/2019    BCR 14.4 05/20/2019    K 4.5 05/20/2019    CO2 28.8 05/20/2019    CALCIUM 9.4 05/20/2019    ALBUMIN 4.60 05/20/2019    LABIL2 1.8 12/31/2018    AST 28 05/20/2019    ALT 29 05/20/2019         Assessment/Plan     1. Primary mediastinal large B cell non-Hodgkin lymphoma: The patient was diagnosed in 2004 and was treated with CHOP/Rituxan x6 cycles and subsequently received consolidative radiation therapy with treatment completed in early 2005.  He has had no evidence of recurrence.  At his visit in 2017, we did discuss again potential increased risk for premature coronary artery disease.  He did undergo a stress test sometime around 2012 due to some atypical chest pain and this apparently was a negative study.  He did see cardiology Dr. Crisostomo in 2017 and underwent echocardiogram on 5/1/17 showing an ejection fraction of 53.2% and no valvular abnormalities.  It was recommended for him to continue with every 2-3 year repeat echocardiogram studies to evaluate for underlying structural/valvular abnormalities from prior radiation/fibrosis.  He is asymptomatic in regards to coronary artery disease and no further routine follow-up ischemic evaluation was requested.  We are performing chest x-ray on a every other year basis and the study from 5/4/2018 was unchanged.  Today, the patient returns for an annual visit clinically doing well.  There is a small palpable nodule he has noted in the left biceps region which is 1 cm, firm, mobile, nontender.  He reports it is been present for nearly a year and he has not noticed any change.  The etiology of this is unclear.  I did suggest that we evaluate the area with ultrasound and he agrees.  We will have this performed in the next 1 to 2 weeks and he will call for the report.  Assuming this is negative, he will return for routine annual  follow-up next year and we will plan to recheck chest x-ray at that time.  He will likely require cardiology follow-up for echocardiogram then as well (3-year interval).  2. Health maintenance: The patient did undergo a screening colonoscopy in December 2015 with a negative result.  Follow-up will be anticipated at a 10 year interval.     PLAN:      1. Ultrasound of the left upper arm to evaluate palpable nodule.  This will be performed in the next 1 to 2 weeks and the patient will call for the report.  2. Return here in 1 year for an MD visit with CBC, CMP and chest x-ray.

## 2020-08-31 ENCOUNTER — TELEPHONE (OUTPATIENT)
Dept: ONCOLOGY | Facility: HOSPITAL | Age: 60
End: 2020-08-31

## 2020-08-31 ENCOUNTER — OFFICE VISIT (OUTPATIENT)
Dept: ONCOLOGY | Facility: CLINIC | Age: 60
End: 2020-08-31

## 2020-08-31 ENCOUNTER — APPOINTMENT (OUTPATIENT)
Dept: OTHER | Facility: HOSPITAL | Age: 60
End: 2020-08-31

## 2020-08-31 VITALS
OXYGEN SATURATION: 98 % | HEART RATE: 75 BPM | SYSTOLIC BLOOD PRESSURE: 158 MMHG | RESPIRATION RATE: 16 BRPM | BODY MASS INDEX: 27.98 KG/M2 | TEMPERATURE: 97.5 F | HEIGHT: 72 IN | DIASTOLIC BLOOD PRESSURE: 81 MMHG | WEIGHT: 206.6 LBS

## 2020-08-31 DIAGNOSIS — Z92.3 HISTORY OF RADIATION THERAPY: ICD-10-CM

## 2020-08-31 DIAGNOSIS — C83.32 DIFFUSE LARGE B-CELL LYMPHOMA OF INTRATHORACIC LYMPH NODES (HCC): Primary | ICD-10-CM

## 2020-08-31 DIAGNOSIS — D75.89 MACROCYTOSIS WITHOUT ANEMIA: ICD-10-CM

## 2020-08-31 LAB
BASOPHILS # BLD AUTO: 0.02 10*3/MM3 (ref 0–0.2)
BASOPHILS NFR BLD AUTO: 0.4 % (ref 0–1.5)
DEPRECATED RDW RBC AUTO: 41.7 FL (ref 37–54)
EOSINOPHIL # BLD AUTO: 0.07 10*3/MM3 (ref 0–0.4)
EOSINOPHIL NFR BLD AUTO: 1.5 % (ref 0.3–6.2)
ERYTHROCYTE [DISTWIDTH] IN BLOOD BY AUTOMATED COUNT: 12.1 % (ref 12.3–15.4)
FOLATE SERPL-MCNC: >20 NG/ML (ref 4.78–24.2)
HCT VFR BLD AUTO: 43.6 % (ref 37.5–51)
HGB BLD-MCNC: 14.8 G/DL (ref 13–17.7)
IMM GRANULOCYTES # BLD AUTO: 0.02 10*3/MM3 (ref 0–0.05)
IMM GRANULOCYTES NFR BLD AUTO: 0.4 % (ref 0–0.5)
LYMPHOCYTES # BLD AUTO: 1.56 10*3/MM3 (ref 0.7–3.1)
LYMPHOCYTES NFR BLD AUTO: 32.6 % (ref 19.6–45.3)
MCH RBC QN AUTO: 31.6 PG (ref 26.6–33)
MCHC RBC AUTO-ENTMCNC: 33.9 G/DL (ref 31.5–35.7)
MCV RBC AUTO: 93 FL (ref 79–97)
MONOCYTES # BLD AUTO: 0.41 10*3/MM3 (ref 0.1–0.9)
MONOCYTES NFR BLD AUTO: 8.6 % (ref 5–12)
NEUTROPHILS NFR BLD AUTO: 2.7 10*3/MM3 (ref 1.7–7)
NEUTROPHILS NFR BLD AUTO: 56.5 % (ref 42.7–76)
NRBC BLD AUTO-RTO: 0 /100 WBC (ref 0–0.2)
PLATELET # BLD AUTO: 222 10*3/MM3 (ref 140–450)
PMV BLD AUTO: 9.6 FL (ref 6–12)
RBC # BLD AUTO: 4.69 10*6/MM3 (ref 4.14–5.8)
VIT B12 BLD-MCNC: 417 PG/ML (ref 211–946)
WBC # BLD AUTO: 4.78 10*3/MM3 (ref 3.4–10.8)

## 2020-08-31 PROCEDURE — 82746 ASSAY OF FOLIC ACID SERUM: CPT | Performed by: INTERNAL MEDICINE

## 2020-08-31 PROCEDURE — 99214 OFFICE O/P EST MOD 30 MIN: CPT | Performed by: INTERNAL MEDICINE

## 2020-08-31 PROCEDURE — 36415 COLL VENOUS BLD VENIPUNCTURE: CPT | Performed by: INTERNAL MEDICINE

## 2020-08-31 PROCEDURE — 82607 VITAMIN B-12: CPT | Performed by: INTERNAL MEDICINE

## 2020-08-31 PROCEDURE — 85025 COMPLETE CBC W/AUTO DIFF WBC: CPT | Performed by: INTERNAL MEDICINE

## 2020-08-31 RX ORDER — TESTOSTERONE 20.25 MG/1.25G
GEL TOPICAL TAKE AS DIRECTED
COMMUNITY
Start: 2020-08-27

## 2020-08-31 NOTE — PROGRESS NOTES
Subjective .     REASONS FOR FOLLOWUP:    Primary mediastinal large B cell non-Hodgkin's lymphoma status post CHOP/Rituxan chemotherapy x six cycles, completed January 2005, with subsequent consolidative mediastinal radiation therapy.      HISTORY OF PRESENT ILLNESS:  The patient is a 60 y.o. year old male who is here for follow-up with the above-mentioned history.    History of Present Illness   patient returns today in follow-up for an annual visit with laboratory studies to review.  He was to have also undergone chest x-ray which has not yet been performed.  This is at a 2-year interval.  He has no new complaints.  After his last visit we did evaluate an area around his left biceps which on ultrasound turned out to be a lipoma as suspected.  The area has not changed.  He did see his primary care physician just recently and there were concerns with labs from 8/11/2020 that he had a new elevation in MCV at 104.4 however his counts were normal.  He did apparently have some microscopic hematuria as well.    PAST MEDICAL HISTORY:  Hyperlipidemia.   Past Medical History:   Diagnosis Date   • Anxiety    • Depression    • History of radiation therapy 2005    To mediastinum    • Hyperlipidemia    • NHL (non-Hodgkin's lymphoma) (CMS/HCC) 2004    Diffuse large B cell     Past Surgical History:   Procedure Laterality Date   • APPENDECTOMY      As a child   • COLONOSCOPY  12/2015    Screening, negative/[facility]     ONCOLOGIC HISTORY:  (History from previous dates can be found in the separate document.)    Current Outpatient Medications on File Prior to Visit   Medication Sig Dispense Refill   • Multiple Vitamins-Minerals (MULTIVITAMIN & MINERAL PO) Take  by mouth.     • rosuvastatin (CRESTOR) 10 MG tablet Take 10 mg by mouth daily.     • Testosterone 1.62 % gel      • vitamin C (ASCORBIC ACID) 500 MG tablet Take 500 mg by mouth Daily.       No current facility-administered medications on file prior to visit.         ALLERGIES:   No Known Allergies    SOCIAL HISTORY:    MARITAL HISTORY:  , living with spouse. Three children  OCCUPATION:   .  TOBACCO USE:   Has no significant smoking history.  ALCOHOL:   Does not give any significant history of alcohol usage.  Social History     Socioeconomic History   • Marital status:      Spouse name: Conchita   • Number of children: 3   • Years of education: College   • Highest education level: Not on file   Occupational History   • Occupation:      Employer: [employer]   Tobacco Use   • Smoking status: Never Smoker   • Smokeless tobacco: Never Used   Substance and Sexual Activity   • Alcohol use: Yes     Comment: Socially    • Drug use: No         FAMILY HISTORY:  Noncontributory.  Patient has one brother. Father is a retired surgeon.   Family History   Problem Relation Age of Onset   • Coronary artery disease Father         Father with CABG in mid 70's   • Heart disease Father         Father with pacemaker          Review of Systems   Constitutional: Negative for activity change, appetite change, fatigue, fever and unexpected weight change.   HENT: Negative for congestion, mouth sores, nosebleeds, sore throat and voice change.    Respiratory: Negative for cough, shortness of breath and wheezing.    Cardiovascular: Negative for chest pain, palpitations and leg swelling.   Gastrointestinal: Negative for abdominal distention, abdominal pain, blood in stool, constipation, diarrhea, nausea and vomiting.   Endocrine: Negative for cold intolerance and heat intolerance.   Genitourinary: Negative for difficulty urinating, dysuria, frequency and hematuria.   Musculoskeletal: Negative for arthralgias, back pain, joint swelling and myalgias.   Skin: Negative for rash.   Neurological: Negative for dizziness, syncope, weakness, light-headedness, numbness and headaches.   Hematological: Negative for adenopathy. Does not bruise/bleed easily.   Psychiatric/Behavioral:  Negative for confusion and sleep disturbance. The patient is not nervous/anxious.          Objective      Vitals:    08/31/20 1103   BP: 158/81   Pulse: 75   Resp: 16   Temp: 97.5 °F (36.4 °C)   SpO2: 98%      Current Status 8/31/2020   ECOG score 0   Pain 0/10    Physical Exam   Constitutional: He is oriented to person, place, and time. He appears well-developed and well-nourished.   HENT:   Mouth/Throat: Oropharynx is clear and moist.   Eyes: Conjunctivae are normal.   Neck: No thyromegaly present.   Cardiovascular: Normal rate and regular rhythm. Exam reveals no gallop and no friction rub.   No murmur heard.  Pulmonary/Chest: Breath sounds normal. No respiratory distress.   Abdominal: Soft. Bowel sounds are normal. He exhibits no distension. There is no tenderness.   Musculoskeletal: He exhibits no edema.   There is an unchanged 1 cm mobile nodule around the left biceps muscle, consistent with lipoma.   Lymphadenopathy:        Head (right side): No submandibular adenopathy present.     He has no cervical adenopathy.     He has no axillary adenopathy.        Right: No inguinal and no supraclavicular adenopathy present.        Left: No inguinal and no supraclavicular adenopathy present.   Neurological: He is alert and oriented to person, place, and time. He displays normal reflexes. No cranial nerve deficit. He exhibits normal muscle tone.   Skin: Skin is warm and dry. No rash noted.   Psychiatric: He has a normal mood and affect. His behavior is normal.       RECENT LABS:  Hematology WBC   Date Value Ref Range Status   08/31/2020 4.78 3.40 - 10.80 10*3/mm3 Final   08/11/2020 5.45 4.5 - 11.0 10*3/uL Final     RBC   Date Value Ref Range Status   08/31/2020 4.69 4.14 - 5.80 10*6/mm3 Final   08/11/2020 4.55 4.5 - 5.9 10*6/uL Final     Hemoglobin   Date Value Ref Range Status   08/31/2020 14.8 13.0 - 17.7 g/dL Final   08/11/2020 14.3 13.5 - 17.5 g/dL Final     Hematocrit   Date Value Ref Range Status   08/31/2020 43.6  37.5 - 51.0 % Final   08/11/2020 47.5 41.0 - 53.0 % Final     Platelets   Date Value Ref Range Status   08/31/2020 222 140 - 450 10*3/mm3 Final   08/11/2020 219 140 - 440 10*3/uL Final        Lab Results   Component Value Date    GLUCOSE 112 (H) 05/20/2019    BUN 14 08/11/2020    CREATININE 0.9 08/11/2020    EGFRIFNONA 74 05/20/2019    BCR 15.0 08/11/2020    K 4.5 08/11/2020    CO2 23 08/11/2020    CALCIUM 9.6 08/11/2020    ALBUMIN 4.7 08/11/2020    LABIL2 1.9 08/11/2020    AST 34 08/11/2020    ALT 27 08/11/2020     Reviewed ultrasound report from 7/22/2019 as outlined above.  Reviewed outside laboratory studies as outlined above and below.    Assessment/Plan      1. Primary mediastinal large B cell non-Hodgkin lymphoma:  · The patient was diagnosed in 2004 and was treated with CHOP/Rituxan x6 cycles and subsequently received consolidative radiation therapy with treatment completed in early 2005.    · He has had no evidence of recurrence.    · At his visit in 2017, we did discuss again potential increased risk for premature coronary artery disease.  He did undergo a stress test sometime around 2012 due to some atypical chest pain and this apparently was a negative study.  He did see cardiology Dr. Crisostomo in 2017 and underwent echocardiogram on 5/1/17 showing an ejection fraction of 53.2% and no valvular abnormalities.  It was recommended for him to continue with every 2-3 year repeat echocardiogram studies to evaluate for underlying structural/valvular abnormalities from prior radiation/fibrosis.  He was asymptomatic in regards to coronary artery disease and no further routine follow-up ischemic evaluation was requested.    · We are performing chest x-ray on a every other year basis with the last study 5/4/2018 which was unchanged.    · Patient returns today for a one-year follow-up visit.  He has continued to do very well since last year.  We did evaluate the palpable abnormality around his left biceps which by  ultrasound on 7/22/2019 was confirmed to represent a 1.8 cm lipoma.  It is unchanged on exam today.  There is no significance.  There was concern regarding new elevation in MCV seen on recent labs by his primary care physician and this will be discussed below.  We did discuss referral back to cardiology to discuss need for follow-up echocardiogram as it has been 3 years since the last study (5/1/2017) and to discuss any potential need for follow-up stress test regarding premature coronary artery disease from prior radiation therapy.  I will see him again in 1 year.  We did discuss pursuing his 2-year interval chest x-ray to assess for potential underlying secondary malignancies and that will be performed in the next week and he will call for report.  2. Health maintenance:   · The patient did undergo a screening colonoscopy in December 2015 with a negative result.  Follow-up will be anticipated at a 10 year interval.   3. Transiently elevated MCV:  · Patient with previously normal counts and MCV  · Recent labs with PCP on 8/11/2020 with .4, normal counts.  · Patient does note that he has been drinking more alcohol since he has been at home during the pandemic drinking 2-3 drinks 3 times per week which could be a contributing factor  · We did check B12 and folate today which are pending  · We did repeat the CBC today showing actually a normal MCV at 93.  The likely explanation of the previously elevated level is that of an artifact produced from the specimen sitting for a prolonged period of time before testing was performed.  · Therefore with transient elevation and likely artifact I do not have significant concerns regarding this issue.  4. Reported microscopic hematuria:  · Patient reports that his urinalysis had some evidence of microscopic hematuria.  Will defer to PCP in regards to urology evaluation.  Patient reports that he is to have a repeat study performed in the near future.    PLAN:   1. B12 and  folate pending from today.  I will notify the patient of any abnormal results.  2. The patient will undergo chest x-ray in the next week (2-year interval follow-up study).  We will notify him of the result.  3. Referral back to cardiology to discuss 3 interval follow-up echocardiogram and possible need for further stress test in regards to previous mediastinal radiation.  4. The patient will follow-up with his PCP in regards to microscopic hematuria and need for additional evaluation with urology  5. MD visit in 1 year with CBC, CMP    The patient was seen for prior issues as outlined above and also had a new issue today (elevated MCV) for which additional testing was undertaken as noted.

## 2020-08-31 NOTE — TELEPHONE ENCOUNTER
Called pt and informed him of results. He v/u.     ----- Message from Zach Nolan Jr., MD sent at 8/31/2020 12:43 PM EDT -----  Please notify patient of normal B12 and folate results.  ----- Message -----  From: Lab, Background User  Sent: 8/31/2020  11:57 AM EDT  To: Zach Nolan Jr., MD

## 2021-08-20 NOTE — PROGRESS NOTES
Chief Complaint  Primary mediastinal large B cell non-Hodgkin's lymphoma status post CHOP/Rituxan chemotherapy x six cycles, completed January 2005, with subsequent consolidative mediastinal radiation therapy.    Subjective        History of Present Illness  Patient is here today for a 1 year interval follow-up visit.  He did not undergo chest x-ray after his last visit as we had discussed at the 2-year interval.  He returns today reporting that he has had some difficulty over the past 6 months with back pain.  Has been fairly constant with associated intermittent right sciatica.  He did undergo a cortisone injection around a month ago which helped to a minor extent.  He is getting ready to start some physical therapy soon.  He has not undergone any imaging.  He has not been back to follow-up with cardiology.  He was due for a 3-year interval follow-up echocardiogram 2020.  He denies any shortness of breath, no lower extremity edema.  Patient reports normal appetite, denies any weight loss.  He denies any fevers or night sweats.      Objective   Vital Signs:   /79   Pulse 94   Temp 97.8 °F (36.6 °C)   Wt 94.7 kg (208 lb 11.2 oz)   SpO2 97%   BMI 27.96 kg/m²     Physical Exam  Constitutional:       Appearance: He is well-developed.   Eyes:      Conjunctiva/sclera: Conjunctivae normal.   Neck:      Thyroid: No thyromegaly.   Cardiovascular:      Rate and Rhythm: Normal rate and regular rhythm.      Heart sounds: No murmur heard.   No friction rub. No gallop.    Pulmonary:      Effort: No respiratory distress.      Breath sounds: Normal breath sounds.   Abdominal:      General: Bowel sounds are normal. There is no distension.      Palpations: Abdomen is soft.      Tenderness: There is no abdominal tenderness.   Lymphadenopathy:      Head:      Right side of head: No submandibular adenopathy.      Cervical: No cervical adenopathy.      Upper Body:      Right upper body: No supraclavicular adenopathy.       Left upper body: No supraclavicular adenopathy.   Skin:     General: Skin is warm and dry.      Findings: No rash.   Neurological:      Mental Status: He is alert and oriented to person, place, and time.      Cranial Nerves: No cranial nerve deficit.      Motor: No abnormal muscle tone.      Deep Tendon Reflexes: Reflexes normal.   Psychiatric:         Behavior: Behavior normal.        Result Review : Reviewed CBC, CMP from today.        Assessment and Plan    1. Primary mediastinal large B cell non-Hodgkin lymphoma:  · The patient was diagnosed in 2004 and was treated with CHOP/Rituxan x6 cycles and subsequently received consolidative radiation therapy with treatment completed in early 2005.    · He has had no evidence of recurrence.    · At his visit in 2017, we did discuss again potential increased risk for premature coronary artery disease.  He did undergo a stress test sometime around 2012 due to some atypical chest pain and this was a negative study.  He did see cardiology Dr. Crisostomo in 2017 and underwent echocardiogram on 5/1/17 showing an ejection fraction of 53.2% and no valvular abnormalities.  It was recommended for him to continue with every 2-3 year repeat echocardiogram studies to evaluate for underlying structural/valvular abnormalities from prior radiation/fibrosis.  He is asymptomatic in regards to coronary artery disease and no further routine follow-up ischemic evaluation was requested.    · We had been performing chest x-ray on a every other year basis given risk for secondary malignancies in the setting of prior chest irradiation.  Last chest x-ray performed 5/4/2018 with evidence of radiation fibrosis along mediastinal contours.  · Today patient returns for a 1 year follow-up visit.  He did not undergo chest x-ray as we had planned after his visit last year.  I did suggest again today that we pursue chest x-ray and the patient does agree.  He is also overdue for a 3-year interval follow-up  echocardiogram with cardiology and I will refer him back to cardiology for evaluation and discussion of need for additional testing.  He notes some difficulty with right-sided sciatica and back pain that have been present for approximately 6 months.  He did undergo steroid injection around a month ago which helped and physical therapy is due to start soon.  I did suggest that we obtain MRI to assess lumbar spine.  I will notify him of the result.  We will continue with annual follow-up  2. Health maintenance:   · The patient did undergo a screening colonoscopy in December 2015 with a negative result.  Follow-up will be anticipated at a 10 year interval      Plan:  1. Chest x-ray today  2. MRI lumbar spine in the next 1 to 2 weeks and we will notify patient of the result  3. Patient is preparing to undergo physical therapy for his back pain and right sciatica  4. Referral back to cardiology, overdue for 3-year interval follow-up echocardiogram in relation to previous mediastinal radiation.  5. MD visit in 1 year with CBC, CMP

## 2021-08-23 ENCOUNTER — APPOINTMENT (OUTPATIENT)
Dept: OTHER | Facility: HOSPITAL | Age: 61
End: 2021-08-23

## 2021-08-23 ENCOUNTER — OFFICE VISIT (OUTPATIENT)
Dept: ONCOLOGY | Facility: CLINIC | Age: 61
End: 2021-08-23

## 2021-08-23 ENCOUNTER — LAB (OUTPATIENT)
Dept: OTHER | Facility: HOSPITAL | Age: 61
End: 2021-08-23

## 2021-08-23 VITALS
HEART RATE: 94 BPM | OXYGEN SATURATION: 97 % | BODY MASS INDEX: 27.96 KG/M2 | SYSTOLIC BLOOD PRESSURE: 119 MMHG | WEIGHT: 208.7 LBS | DIASTOLIC BLOOD PRESSURE: 79 MMHG | TEMPERATURE: 97.8 F

## 2021-08-23 DIAGNOSIS — Z92.3 HISTORY OF RADIATION THERAPY: ICD-10-CM

## 2021-08-23 DIAGNOSIS — C83.32 DIFFUSE LARGE B-CELL LYMPHOMA OF INTRATHORACIC LYMPH NODES (HCC): ICD-10-CM

## 2021-08-23 DIAGNOSIS — C83.32 DIFFUSE LARGE B-CELL LYMPHOMA OF INTRATHORACIC LYMPH NODES (HCC): Primary | ICD-10-CM

## 2021-08-23 LAB
ALBUMIN SERPL-MCNC: 4.5 G/DL (ref 3.5–5.2)
ALBUMIN/GLOB SERPL: 1.7 G/DL
ALP SERPL-CCNC: 76 U/L (ref 39–117)
ALT SERPL W P-5'-P-CCNC: 21 U/L (ref 1–41)
ANION GAP SERPL CALCULATED.3IONS-SCNC: 8.3 MMOL/L (ref 5–15)
AST SERPL-CCNC: 23 U/L (ref 1–40)
BASOPHILS # BLD AUTO: 0.03 10*3/MM3 (ref 0–0.2)
BASOPHILS NFR BLD AUTO: 0.6 % (ref 0–1.5)
BILIRUB SERPL-MCNC: 0.3 MG/DL (ref 0–1.2)
BUN SERPL-MCNC: 13 MG/DL (ref 6–20)
BUN/CREAT SERPL: 13.8 (ref 7–25)
CALCIUM SPEC-SCNC: 9.5 MG/DL (ref 8.6–10.5)
CHLORIDE SERPL-SCNC: 106 MMOL/L (ref 98–107)
CO2 SERPL-SCNC: 27.7 MMOL/L (ref 22–29)
CREAT SERPL-MCNC: 0.94 MG/DL (ref 0.76–1.27)
DEPRECATED RDW RBC AUTO: 42.7 FL (ref 37–54)
EOSINOPHIL # BLD AUTO: 0.11 10*3/MM3 (ref 0–0.4)
EOSINOPHIL NFR BLD AUTO: 2.2 % (ref 0.3–6.2)
ERYTHROCYTE [DISTWIDTH] IN BLOOD BY AUTOMATED COUNT: 12.4 % (ref 12.3–15.4)
GFR SERPL CREATININE-BSD FRML MDRD: 82 ML/MIN/1.73
GLOBULIN UR ELPH-MCNC: 2.7 GM/DL
GLUCOSE SERPL-MCNC: 136 MG/DL (ref 65–99)
HCT VFR BLD AUTO: 41.9 % (ref 37.5–51)
HGB BLD-MCNC: 14.2 G/DL (ref 13–17.7)
IMM GRANULOCYTES # BLD AUTO: 0.02 10*3/MM3 (ref 0–0.05)
IMM GRANULOCYTES NFR BLD AUTO: 0.4 % (ref 0–0.5)
LYMPHOCYTES # BLD AUTO: 1.3 10*3/MM3 (ref 0.7–3.1)
LYMPHOCYTES NFR BLD AUTO: 26.1 % (ref 19.6–45.3)
MCH RBC QN AUTO: 31.8 PG (ref 26.6–33)
MCHC RBC AUTO-ENTMCNC: 33.9 G/DL (ref 31.5–35.7)
MCV RBC AUTO: 93.7 FL (ref 79–97)
MONOCYTES # BLD AUTO: 0.38 10*3/MM3 (ref 0.1–0.9)
MONOCYTES NFR BLD AUTO: 7.6 % (ref 5–12)
NEUTROPHILS NFR BLD AUTO: 3.15 10*3/MM3 (ref 1.7–7)
NEUTROPHILS NFR BLD AUTO: 63.1 % (ref 42.7–76)
NRBC BLD AUTO-RTO: 0 /100 WBC (ref 0–0.2)
PLATELET # BLD AUTO: 220 10*3/MM3 (ref 140–450)
PMV BLD AUTO: 9.6 FL (ref 6–12)
POTASSIUM SERPL-SCNC: 3.8 MMOL/L (ref 3.5–5.2)
PROT SERPL-MCNC: 7.2 G/DL (ref 6–8.5)
RBC # BLD AUTO: 4.47 10*6/MM3 (ref 4.14–5.8)
SODIUM SERPL-SCNC: 142 MMOL/L (ref 136–145)
WBC # BLD AUTO: 4.99 10*3/MM3 (ref 3.4–10.8)

## 2021-08-23 PROCEDURE — 85025 COMPLETE CBC W/AUTO DIFF WBC: CPT | Performed by: INTERNAL MEDICINE

## 2021-08-23 PROCEDURE — 80053 COMPREHEN METABOLIC PANEL: CPT | Performed by: INTERNAL MEDICINE

## 2021-08-23 PROCEDURE — 36415 COLL VENOUS BLD VENIPUNCTURE: CPT

## 2021-08-23 PROCEDURE — 99213 OFFICE O/P EST LOW 20 MIN: CPT | Performed by: INTERNAL MEDICINE

## 2021-08-24 ENCOUNTER — TELEPHONE (OUTPATIENT)
Dept: ONCOLOGY | Facility: CLINIC | Age: 61
End: 2021-08-24

## 2021-08-24 NOTE — TELEPHONE ENCOUNTER
Called pt and informed him that he should get the Covid Vaccine 8 months. He V/U.   ----- Message from Zach Nolan Jr., MD sent at 8/23/2021  4:29 PM EDT -----  Regarding: RE: COVID-VACCINE  He should get it at 8 months  ----- Message -----  From: Saima Alba  Sent: 8/23/2021  12:03 PM EDT  To: Zach Nolan Jr., MD  Subject: COVID-VACCINE                                    PATIENT IS ASKING, PLEASE REVIEW, PT HAD HIS LAST INJECTION IN April, PT WANTS TO KNOW IF HE SHOULD GET THE BOOSTER WHEN IT BECOMES AVAILABLE OR WAIT THE 8 MTHS, PLEASE ADVISE.     THANK YOU   SAIMA

## 2021-08-25 ENCOUNTER — HOSPITAL ENCOUNTER (OUTPATIENT)
Dept: MRI IMAGING | Facility: HOSPITAL | Age: 61
Discharge: HOME | End: 2021-08-25

## 2021-08-25 ENCOUNTER — TELEPHONE (OUTPATIENT)
Dept: ONCOLOGY | Facility: CLINIC | Age: 61
End: 2021-08-25

## 2021-08-25 ENCOUNTER — HOSPITAL ENCOUNTER (OUTPATIENT)
Dept: GENERAL RADIOLOGY | Facility: HOSPITAL | Age: 61
Discharge: HOME | End: 2021-08-25

## 2021-08-25 DIAGNOSIS — C83.32 DIFFUSE LARGE B-CELL LYMPHOMA OF INTRATHORACIC LYMPH NODES (HCC): ICD-10-CM

## 2021-08-25 PROCEDURE — 71046 X-RAY EXAM CHEST 2 VIEWS: CPT

## 2021-08-25 NOTE — TELEPHONE ENCOUNTER
Called pt. Informed him of CXR results. He states he went to have his MRI done and he wasn't able to do it. He is requested orders be sent to [place] so he could do the open MRI. Called [facility] to get fax number ([phone]). Informed Dr. Nolan of this. Sent orders to [facility].   ----- Message from Zach Nolan Jr., MD sent at 8/25/2021 12:13 PM EDT -----  Please notify patient CXR was negative  ----- Message -----  From: Interface, Rad Results [place] In  Sent: 8/25/2021  11:56 AM EDT  To: Zach Nolan Jr., MD

## 2021-08-27 ENCOUNTER — TELEPHONE (OUTPATIENT)
Dept: GENERAL RADIOLOGY | Facility: HOSPITAL | Age: 61
End: 2021-08-27

## 2021-08-27 NOTE — TELEPHONE ENCOUNTER
----- Message from Zach Nolan Jr., MD sent at 8/23/2021  4:29 PM EDT -----  Regarding: RE: COVID-VACCINE  He should get it at 8 months  ----- Message -----  From: Parth Saima PATRICIO  Sent: 8/23/2021  12:03 PM EDT  To: Zach Nolan Jr., MD  Subject: COVID-VACCINE                                    PATIENT IS ASKING, PLEASE REVIEW, PT HAD HIS LAST INJECTION IN April, PT WANTS TO KNOW IF HE SHOULD GET THE BOOSTER WHEN IT BECOMES AVAILABLE OR WAIT THE 8 MTHS, PLEASE ADVISE.     THANK YOU   SAIMA

## 2021-09-10 ENCOUNTER — TELEPHONE (OUTPATIENT)
Dept: ONCOLOGY | Facility: CLINIC | Age: 61
End: 2021-09-10

## 2021-09-10 NOTE — TELEPHONE ENCOUNTER
Called pt. Informed him of message/results below. He V/U. He also requested report be sent to his PCP for their records. Routed through [facility] via fax number.   ----- Message from Zach Nolan Jr., MD sent at 9/10/2021  7:56 AM EDT -----  Please let patient know that MRI showed benign findings, showed degenerative/arthritic change as the likely cause of his back pain.  Thanks!  ----- Message -----  From: Interface, Scans Incoming  Sent: 9/8/2021  10:19 AM EDT  To: Zach Nolan Jr., MD

## 2021-09-23 ENCOUNTER — OFFICE VISIT (OUTPATIENT)
Dept: CARDIOLOGY | Facility: CLINIC | Age: 61
End: 2021-09-23

## 2021-09-23 VITALS
DIASTOLIC BLOOD PRESSURE: 70 MMHG | SYSTOLIC BLOOD PRESSURE: 120 MMHG | BODY MASS INDEX: 28.44 KG/M2 | HEIGHT: 72 IN | HEART RATE: 75 BPM | WEIGHT: 210 LBS

## 2021-09-23 DIAGNOSIS — R07.2 PRECORDIAL PAIN: ICD-10-CM

## 2021-09-23 DIAGNOSIS — Z92.3 HISTORY OF RADIATION THERAPY: Primary | ICD-10-CM

## 2021-09-23 PROCEDURE — 99204 OFFICE O/P NEW MOD 45 MIN: CPT | Performed by: INTERNAL MEDICINE

## 2021-09-23 PROCEDURE — 93000 ELECTROCARDIOGRAM COMPLETE: CPT | Performed by: INTERNAL MEDICINE

## 2021-10-04 ENCOUNTER — HOSPITAL ENCOUNTER (OUTPATIENT)
Dept: CARDIOLOGY | Facility: HOSPITAL | Age: 61
Discharge: HOME | End: 2021-10-04
Admitting: INTERNAL MEDICINE

## 2021-10-04 VITALS
SYSTOLIC BLOOD PRESSURE: 140 MMHG | OXYGEN SATURATION: 99 % | DIASTOLIC BLOOD PRESSURE: 90 MMHG | WEIGHT: 210 LBS | HEIGHT: 72 IN | HEART RATE: 86 BPM | BODY MASS INDEX: 28.44 KG/M2

## 2021-10-04 DIAGNOSIS — R07.2 PRECORDIAL PAIN: ICD-10-CM

## 2021-10-04 DIAGNOSIS — Z92.3 HISTORY OF RADIATION THERAPY: ICD-10-CM

## 2021-10-04 PROCEDURE — 93352 ADMIN ECG CONTRAST AGENT: CPT | Performed by: INTERNAL MEDICINE

## 2021-10-04 PROCEDURE — 93016 CV STRESS TEST SUPVJ ONLY: CPT | Performed by: INTERNAL MEDICINE

## 2021-10-04 PROCEDURE — 93320 DOPPLER ECHO COMPLETE: CPT | Performed by: INTERNAL MEDICINE

## 2021-10-04 PROCEDURE — 93017 CV STRESS TEST TRACING ONLY: CPT

## 2021-10-04 PROCEDURE — 25010000002 PERFLUTREN (DEFINITY) 8.476 MG IN SODIUM CHLORIDE (PF) 0.9 % 10 ML INJECTION: Performed by: INTERNAL MEDICINE

## 2021-10-04 PROCEDURE — 93325 DOPPLER ECHO COLOR FLOW MAPG: CPT

## 2021-10-04 PROCEDURE — 93356 MYOCRD STRAIN IMG SPCKL TRCK: CPT | Performed by: INTERNAL MEDICINE

## 2021-10-04 PROCEDURE — 93320 DOPPLER ECHO COMPLETE: CPT

## 2021-10-04 PROCEDURE — 93350 STRESS TTE ONLY: CPT | Performed by: INTERNAL MEDICINE

## 2021-10-04 PROCEDURE — 93350 STRESS TTE ONLY: CPT

## 2021-10-04 PROCEDURE — 93325 DOPPLER ECHO COLOR FLOW MAPG: CPT | Performed by: INTERNAL MEDICINE

## 2021-10-04 PROCEDURE — 93356 MYOCRD STRAIN IMG SPCKL TRCK: CPT

## 2021-10-04 PROCEDURE — 93018 CV STRESS TEST I&R ONLY: CPT | Performed by: INTERNAL MEDICINE

## 2021-10-04 RX ADMIN — PERFLUTREN 3 ML: 6.52 INJECTION, SUSPENSION INTRAVENOUS at 14:54

## 2021-10-05 LAB
AORTIC ARCH: 2.9 CM
ASCENDING AORTA: 3.5 CM
BH CV ECHO MEAS - ACS: 2.1 CM
BH CV ECHO MEAS - AO MAX PG (FULL): 2.7 MMHG
BH CV ECHO MEAS - AO MAX PG: 6.1 MMHG
BH CV ECHO MEAS - AO MEAN PG (FULL): 2.1 MMHG
BH CV ECHO MEAS - AO MEAN PG: 3.9 MMHG
BH CV ECHO MEAS - AO ROOT AREA (BSA CORRECTED): 1.5
BH CV ECHO MEAS - AO ROOT AREA: 8.1 CM^2
BH CV ECHO MEAS - AO ROOT DIAM: 3.2 CM
BH CV ECHO MEAS - AO V2 MAX: 123.5 CM/SEC
BH CV ECHO MEAS - AO V2 MEAN: 92.3 CM/SEC
BH CV ECHO MEAS - AO V2 VTI: 26.1 CM
BH CV ECHO MEAS - ASC AORTA: 3.5 CM
BH CV ECHO MEAS - AVA(I,A): 2.7 CM^2
BH CV ECHO MEAS - AVA(I,D): 2.7 CM^2
BH CV ECHO MEAS - AVA(V,A): 3 CM^2
BH CV ECHO MEAS - AVA(V,D): 3 CM^2
BH CV ECHO MEAS - BSA(HAYCOCK): 2.2 M^2
BH CV ECHO MEAS - BSA: 2.2 M^2
BH CV ECHO MEAS - BZI_BMI: 28.5 KILOGRAMS/M^2
BH CV ECHO MEAS - BZI_METRIC_HEIGHT: 182.9 CM
BH CV ECHO MEAS - BZI_METRIC_WEIGHT: 95.3 KG
BH CV ECHO MEAS - EDV(CUBED): 78.9 ML
BH CV ECHO MEAS - EDV(MOD-SP2): 77 ML
BH CV ECHO MEAS - EDV(MOD-SP4): 100 ML
BH CV ECHO MEAS - EDV(TEICH): 82.5 ML
BH CV ECHO MEAS - EF(CUBED): 61.5 %
BH CV ECHO MEAS - EF(MOD-BP): 61 %
BH CV ECHO MEAS - EF(MOD-SP2): 54.5 %
BH CV ECHO MEAS - EF(MOD-SP4): 64 %
BH CV ECHO MEAS - EF(TEICH): 53.3 %
BH CV ECHO MEAS - ESV(CUBED): 30.4 ML
BH CV ECHO MEAS - ESV(MOD-SP2): 35 ML
BH CV ECHO MEAS - ESV(MOD-SP4): 36 ML
BH CV ECHO MEAS - ESV(TEICH): 38.5 ML
BH CV ECHO MEAS - FS: 27.2 %
BH CV ECHO MEAS - IVS/LVPW: 1.1
BH CV ECHO MEAS - IVSD: 1.1 CM
BH CV ECHO MEAS - LAT PEAK E' VEL: 11.5 CM/SEC
BH CV ECHO MEAS - LV DIASTOLIC VOL/BSA (35-75): 46 ML/M^2
BH CV ECHO MEAS - LV MASS(C)D: 164.1 GRAMS
BH CV ECHO MEAS - LV MASS(C)DI: 75.5 GRAMS/M^2
BH CV ECHO MEAS - LV MAX PG: 3.4 MMHG
BH CV ECHO MEAS - LV MEAN PG: 1.8 MMHG
BH CV ECHO MEAS - LV SYSTOLIC VOL/BSA (12-30): 16.6 ML/M^2
BH CV ECHO MEAS - LV V1 MAX: 92.1 CM/SEC
BH CV ECHO MEAS - LV V1 MEAN: 63.6 CM/SEC
BH CV ECHO MEAS - LV V1 VTI: 17.8 CM
BH CV ECHO MEAS - LVIDD: 4.3 CM
BH CV ECHO MEAS - LVIDS: 3.1 CM
BH CV ECHO MEAS - LVLD AP2: 8.5 CM
BH CV ECHO MEAS - LVLD AP4: 8.7 CM
BH CV ECHO MEAS - LVLS AP2: 7.4 CM
BH CV ECHO MEAS - LVLS AP4: 7.5 CM
BH CV ECHO MEAS - LVOT AREA (M): 4.2 CM^2
BH CV ECHO MEAS - LVOT AREA: 4 CM^2
BH CV ECHO MEAS - LVOT DIAM: 2.3 CM
BH CV ECHO MEAS - LVPWD: 1.1 CM
BH CV ECHO MEAS - MED PEAK E' VEL: 7.4 CM/SEC
BH CV ECHO MEAS - MV A DUR: 0.08 SEC
BH CV ECHO MEAS - MV A MAX VEL: 56.9 CM/SEC
BH CV ECHO MEAS - MV DEC SLOPE: 555.5 CM/SEC^2
BH CV ECHO MEAS - MV DEC TIME: 0.18 SEC
BH CV ECHO MEAS - MV E MAX VEL: 72.2 CM/SEC
BH CV ECHO MEAS - MV E/A: 1.3
BH CV ECHO MEAS - MV MAX PG: 3.7 MMHG
BH CV ECHO MEAS - MV MEAN PG: 1.6 MMHG
BH CV ECHO MEAS - MV P1/2T MAX VEL: 89 CM/SEC
BH CV ECHO MEAS - MV P1/2T: 47 MSEC
BH CV ECHO MEAS - MV V2 MAX: 96.6 CM/SEC
BH CV ECHO MEAS - MV V2 MEAN: 59.8 CM/SEC
BH CV ECHO MEAS - MV V2 VTI: 28.8 CM
BH CV ECHO MEAS - MVA P1/2T LCG: 2.5 CM^2
BH CV ECHO MEAS - MVA(P1/2T): 4.7 CM^2
BH CV ECHO MEAS - MVA(VTI): 2.5 CM^2
BH CV ECHO MEAS - PA ACC TIME: 0.08 SEC
BH CV ECHO MEAS - PA MAX PG (FULL): 0.54 MMHG
BH CV ECHO MEAS - PA MAX PG: 2.4 MMHG
BH CV ECHO MEAS - PA PR(ACCEL): 42.2 MMHG
BH CV ECHO MEAS - PA V2 MAX: 77.4 CM/SEC
BH CV ECHO MEAS - PULM A REVS DUR: 0.08 SEC
BH CV ECHO MEAS - PULM A REVS VEL: 22.9 CM/SEC
BH CV ECHO MEAS - PULM DIAS VEL: 38.3 CM/SEC
BH CV ECHO MEAS - PULM S/D: 1.2
BH CV ECHO MEAS - PULM SYS VEL: 47.2 CM/SEC
BH CV ECHO MEAS - PVA(V,A): 2.9 CM^2
BH CV ECHO MEAS - PVA(V,D): 2.9 CM^2
BH CV ECHO MEAS - QP/QS: 0.63
BH CV ECHO MEAS - RAP SYSTOLE: 3 MMHG
BH CV ECHO MEAS - RV MAX PG: 1.9 MMHG
BH CV ECHO MEAS - RV MEAN PG: 1.2 MMHG
BH CV ECHO MEAS - RV V1 MAX: 68.1 CM/SEC
BH CV ECHO MEAS - RV V1 MEAN: 51.1 CM/SEC
BH CV ECHO MEAS - RV V1 VTI: 13.6 CM
BH CV ECHO MEAS - RVOT AREA: 3.3 CM^2
BH CV ECHO MEAS - RVOT DIAM: 2.1 CM
BH CV ECHO MEAS - RVSP: 14 MMHG
BH CV ECHO MEAS - SI(AO): 96.8 ML/M^2
BH CV ECHO MEAS - SI(CUBED): 22.3 ML/M^2
BH CV ECHO MEAS - SI(LVOT): 33 ML/M^2
BH CV ECHO MEAS - SI(MOD-SP2): 19.3 ML/M^2
BH CV ECHO MEAS - SI(MOD-SP4): 29.4 ML/M^2
BH CV ECHO MEAS - SI(TEICH): 20.2 ML/M^2
BH CV ECHO MEAS - SUP REN AO DIAM: 2.2 CM
BH CV ECHO MEAS - SV(AO): 210.6 ML
BH CV ECHO MEAS - SV(CUBED): 48.5 ML
BH CV ECHO MEAS - SV(LVOT): 71.7 ML
BH CV ECHO MEAS - SV(MOD-SP2): 42 ML
BH CV ECHO MEAS - SV(MOD-SP4): 64 ML
BH CV ECHO MEAS - SV(RVOT): 45.2 ML
BH CV ECHO MEAS - SV(TEICH): 44 ML
BH CV ECHO MEAS - TAPSE (>1.6): 2.2 CM
BH CV ECHO MEAS - TR MAX VEL: 168.3 CM/SEC
BH CV ECHO MEASUREMENTS AVERAGE E/E' RATIO: 7.64
BH CV STRESS BP STAGE 1: NORMAL
BH CV STRESS BP STAGE 2: NORMAL
BH CV STRESS BP STAGE 3: NORMAL
BH CV STRESS DURATION MIN STAGE 1: 3
BH CV STRESS DURATION MIN STAGE 2: 3
BH CV STRESS DURATION MIN STAGE 3: 3
BH CV STRESS DURATION SEC STAGE 1: 0
BH CV STRESS DURATION SEC STAGE 2: 0
BH CV STRESS DURATION SEC STAGE 3: 0
BH CV STRESS ECHO POST STRESS EJECTION FRACTION EF: 76 %
BH CV STRESS GRADE STAGE 1: 10
BH CV STRESS GRADE STAGE 2: 12
BH CV STRESS GRADE STAGE 3: 14
BH CV STRESS HR STAGE 1: 111
BH CV STRESS HR STAGE 2: 135
BH CV STRESS HR STAGE 3: 160
BH CV STRESS METS STAGE 1: 5
BH CV STRESS METS STAGE 2: 7.5
BH CV STRESS METS STAGE 3: 10
BH CV STRESS PROTOCOL 1: NORMAL
BH CV STRESS SPEED STAGE 1: 1.7
BH CV STRESS SPEED STAGE 2: 2.5
BH CV STRESS SPEED STAGE 3: 3.4
BH CV STRESS STAGE 1: 1
BH CV STRESS STAGE 2: 2
BH CV STRESS STAGE 3: 3
BH CV VAS BP RIGHT ARM: NORMAL MMHG
BH CV XLRA - RV BASE: 2.8 CM
BH CV XLRA - RV LENGTH: 7.5 CM
BH CV XLRA - RV MID: 2.3 CM
BH CV XLRA - TDI S': 12.9 CM/SEC
LEFT ATRIUM VOLUME INDEX: 16 ML/M2
MAXIMAL PREDICTED HEART RATE: 161 BPM
PERCENT MAX PREDICTED HR: 99.38 %
SINUS: 3.3 CM
STJ: 3.2 CM
STRESS BASELINE BP: NORMAL MMHG
STRESS BASELINE HR: 86 BPM
STRESS O2 SAT REST: 99 %
STRESS PERCENT HR: 117 %
STRESS POST ESTIMATED WORKLOAD: 10 METS
STRESS POST EXERCISE DUR MIN: 9 MIN
STRESS POST EXERCISE DUR SEC: 0 SEC
STRESS POST PEAK BP: NORMAL MMHG
STRESS POST PEAK HR: 160 BPM
STRESS TARGET HR: 137 BPM

## 2021-10-05 NOTE — PROGRESS NOTES
Can you please make patient aware of normal stress echocardiogram.  Please keep 1 year follow-up with Dr. Crisostomo.    Thanks,  CAIT

## 2021-10-18 NOTE — PROGRESS NOTES
Date of Office Visit:  2021  Encounter Provider: Daryl Crisostomo MD  Place of Service: [facility] CARDIOLOGY  Patient Name: Franklin Da Silva  :1960    Chief complaint: Prior mediastinal chest radiation exposure for non-Hodgkin's lymphoma.    History of Present Illness:    I again had the pleasure of seeing the patient in cardiology office on 2021.  He is a very pleasant 61 year-old male with a history of non-Hodgkin's lymphoma of the chest and mediastinum who presents for follow-up.  I have not seen the patient since 2017, and he is considered a new patient today.    I initially saw the patient on 2017.  He was diagnosed with diffuse large B cell non-Hodgkin's lymphoma in , and underwent chemotherapy.  He subsequently underwent extensive mediastinal chest radiation in .  After his first visit, an echocardiogram was obtained which showed an ejection fraction of 53% and normal diastolic function.  There was no significant valvular disease.  His EKG has shown a first-degree AV block.    The patient presents today to reestablish care with me.  He has not had any major symptoms since his last visit.  He denied any significant chest discomfort other than occasional atypical pain.  He has not had any significant shortness of breath, syncope, or near syncope.    Past Medical History:   Diagnosis Date   • Anxiety    • Depression    • History of radiation therapy     To mediastinum    • Hyperlipidemia    • NHL (non-Hodgkin's lymphoma) (HCC)     Diffuse large B cell       Past Surgical History:   Procedure Laterality Date   • APPENDECTOMY      As a child   • COLONOSCOPY  2015    Screening, negative/[facility]       Current Outpatient Medications on File Prior to Visit   Medication Sig Dispense Refill   • Multiple Vitamins-Minerals (MULTIVITAMIN & MINERAL PO) Take  by mouth Daily.     • rosuvastatin (CRESTOR) 10 MG tablet Take 10 mg by mouth  "daily.     • Testosterone 1.62 % gel Take As Directed.       No current facility-administered medications on file prior to visit.     Allergies as of 09/23/2021   • (No Known Allergies)     Social History     Socioeconomic History   • Marital status:      Spouse name: Conchita   • Number of children: 3   • Years of education: College   Tobacco Use   • Smoking status: Never Smoker   • Smokeless tobacco: Never Used   Substance and Sexual Activity   • Alcohol use: Yes     Comment: Socially /  Daily caffeine use   • Drug use: No     Family History   Problem Relation Age of Onset   • Coronary artery disease Father         Father with CABG in mid 70's   • Heart disease Father         Father with pacemaker    • Hypertension Father    • Prostate cancer Father        Review of Systems   All other systems reviewed and are negative.     Objective:     Vitals:    09/23/21 0938 09/23/21 0945   BP: 120/70 120/70   BP Location: Right arm Left arm   Pulse: 75    Weight: 95.3 kg (210 lb)    Height: 182.9 cm (72\")      Body mass index is 28.48 kg/m².    Constitutional:       Appearance: Well-developed.   Eyes:      Conjunctiva/sclera: Conjunctivae normal.   HENT:      Head: Normocephalic and atraumatic.   Pulmonary:      Effort: Pulmonary effort is normal.      Breath sounds: Normal breath sounds.   Cardiovascular:      Normal rate. Regular rhythm.      Murmurs: There is no murmur.      No gallop.   Edema:     Peripheral edema absent.   Abdominal:      Palpations: Abdomen is soft.      Tenderness: There is no abdominal tenderness.   Musculoskeletal:      Cervical back: Neck supple. Skin:     General: Skin is warm.   Neurological:      Mental Status: Alert and oriented to person, place, and time.   Psychiatric:         Behavior: Behavior normal.       Lab Review:     ECG 12 Lead    Date/Time: 9/23/2021 9:29 AM  Performed by: Daryl Crisostomo MD  Authorized by: Daryl Crisostomo MD   Comparison: compared with previous ECG " from 4/20/2017  Similar to previous ECG  Rhythm: sinus rhythm  Rate: normal  BPM: 75  Conduction: 1st degree AV block    Clinical impression: non-specific ECG             Cardiac Procedures:  1.  Echocardiogram on 5/1/2017: The ejection fraction was 53%.  Diastolic function was normal.  There was no valvular disease.    Assessment:       Diagnosis Plan   1. History of radiation therapy  Adult Stress Echo W/ Cont or Stress Agent if Necessary Per Protocol   2. Precordial pain  Adult Stress Echo W/ Cont or Stress Agent if Necessary Per Protocol     Plan:       The patient is asymptomatic for the most part.  He did have extensive radiation therapy to his chest and mediastinum in 2005.  We again discussed the possibilities of radiation-induced heart disease.  This includes valvular disease, restrictive cardiomyopathy, constrictive pericarditis, and premature coronary artery disease.  He is already on statin therapy.  I am going to recheck a stress echocardiogram at this point to evaluate for any potential ischemia, as well as for any potential structural heart disease.  If normal, I will follow him on a yearly basis.  The patient was in agreement with the plan.

## 2022-09-16 NOTE — PROGRESS NOTES
"Chief Complaint  Primary mediastinal large B cell non-Hodgkin's lymphoma status post CHOP/Rituxan chemotherapy x six cycles, completed January 2005, with subsequent consolidative mediastinal radiation therapy.    Subjective        History of Present Illness  Patient is here today for a 1 year interval follow-up visit.  Following his last visit here he did undergo chest x-ray which we have been performing at 2-year intervals given his history of thoracic radiation.  Chest x-ray on 8/25/2021 was negative.  He was also experiencing difficulty with right-sided sciatica and did undergo MRI of lumbar spine on 9/7/2021 which showed degenerative change, disc disease, canal narrowing but no evidence of malignancy and no specific areas that needed to be addressed.  There was a comment regarding heterogeneous marrow signal of unclear significance.  He did undergo physical therapy and notes that the pain did persist for quite some time however did ultimately resolved spontaneously and has not recurred.  He did follow-up with cardiology on 10/17/2021 and underwent repeat stress echo prior to that on 10/4/2021 that showed ejection fraction 61%, otherwise negative study.  He has no complaints today.  He reports normal appetite, denies weight loss.  He remains reasonably active.      Objective   Vital Signs:   /90   Pulse 75   Temp 97.8 °F (36.6 °C) (Temporal)   Resp 18   Ht 185.5 cm (73.03\") Comment: new ht  Wt 95.3 kg (210 lb 3.2 oz)   SpO2 97%   BMI 27.71 kg/m²     Physical Exam  Constitutional:       Appearance: He is well-developed.   Eyes:      Conjunctiva/sclera: Conjunctivae normal.   Neck:      Thyroid: No thyromegaly.   Cardiovascular:      Rate and Rhythm: Normal rate and regular rhythm.      Heart sounds: No murmur heard.    No friction rub. No gallop.   Pulmonary:      Effort: No respiratory distress.      Breath sounds: Normal breath sounds.   Chest:   Breasts:      Right: No supraclavicular adenopathy. "      Left: No supraclavicular adenopathy.       Abdominal:      General: Bowel sounds are normal. There is no distension.      Palpations: Abdomen is soft.      Tenderness: There is no abdominal tenderness.   Lymphadenopathy:      Head:      Right side of head: No submandibular adenopathy.      Cervical: No cervical adenopathy.      Upper Body:      Right upper body: No supraclavicular adenopathy.      Left upper body: No supraclavicular adenopathy.   Skin:     General: Skin is warm and dry.      Findings: No rash.   Neurological:      Mental Status: He is alert and oriented to person, place, and time.      Cranial Nerves: No cranial nerve deficit.      Motor: No abnormal muscle tone.      Deep Tendon Reflexes: Reflexes normal.   Psychiatric:         Behavior: Behavior normal.       Patient was examined today, unchanged from above     Result Review : Reviewed CBC, CMP from today.  Reviewed chest x-ray 8/25/2021, reviewed MRI of lumbar spine 9/7/2021.  Reviewed cardiology records including stress echocardiogram from 10/4/2021.       Assessment and Plan    1. Primary mediastinal large B cell non-Hodgkin lymphoma:  · The patient was diagnosed in 2004 and was treated with CHOP/Rituxan x6 cycles and subsequently received consolidative radiation therapy with treatment completed in early 2005.    · He has had no evidence of recurrence.    · Due to previous thoracic irradiation, increased risk for premature coronary artery disease, valvular disease, pericardial disease.  He did undergo a stress test sometime around 2012 due to some atypical chest pain and this was a negative study.  He did see cardiology Dr. Crisostomo in 2017 and underwent echocardiogram on 5/1/17 showing an ejection fraction of 53.2% and no valvular abnormalities.    Patient was seen again by cardiology and underwent stress echo on 10/4/2021 which ejection fraction 61%, no significant abnormalities.   Patient has follow-up scheduled with cardiology on  10/24/2022.  · We have been performing chest x-ray on a every other year basis given risk for secondary malignancies in the setting of prior chest irradiation.  Last chest x-ray performed 8/25/2021 which showed mildly prominent lung markings in the right perihilar region stable since prior study.  · Patient returns today in annual follow-up.  He has done quite well over the past year.  He did undergo chest x-ray last year 8/25/2021 which was negative as noted above.  He did see cardiology and underwent repeat stress echo in 10/4/2021 which showed no significant abnormalities.  He does continue annual follow-up with cardiology.  We continue monitoring him for potential late effects primarily from his thoracic irradiation.  We do not expect recurrence of his lymphoma at this timeframe.  His CBC today appears normal and at this point we are getting out of the range during which we would see chemotherapy related MDS/AML issues.  We will continue to monitor chest x-ray every other year and we will schedule this on the same day of his visit again next year.  2. Health maintenance:   · The patient did undergo a screening colonoscopy in December 2015 with a negative result.  Follow-up will be anticipated at a 10 year interval  3. Right-sided sciatica  · Patient was experiencing significant right-sided sciatica when seen on 8/23/2021.  · Patient did undergo MRI lumbar spine 9/7/2022 that showed degenerative change, disc disease, canal narrowing however no specific areas that appear to require additional evaluation or intervention.  There was a comment regarding heterogeneous marrow signal that was indeterminant.  · In terms of the heterogeneous marrow signal, with normal CBC, we are not going to pursue any further evaluation.  · Patient reports that he underwent physical therapy and eventually his sciatica resolved.  He has no residual symptoms currently.      Plan:  1. The patient continues on a course of  surveillance/observation.  At this point he is out of timeframe for recurrence of his lymphoma however we continue to monitor for potential late effects from previous thoracic irradiation.  2. The patient will continue follow-up with cardiology, due to be seen on 10/24/2022  3. MD visit in 1 year with CBC, CMP.  Chest x-ray same day of visit.

## 2022-09-19 ENCOUNTER — LAB (OUTPATIENT)
Dept: OTHER | Facility: HOSPITAL | Age: 62
End: 2022-09-19

## 2022-09-19 ENCOUNTER — OFFICE VISIT (OUTPATIENT)
Dept: ONCOLOGY | Facility: CLINIC | Age: 62
End: 2022-09-19

## 2022-09-19 VITALS
HEIGHT: 73 IN | OXYGEN SATURATION: 97 % | RESPIRATION RATE: 18 BRPM | DIASTOLIC BLOOD PRESSURE: 90 MMHG | TEMPERATURE: 97.8 F | BODY MASS INDEX: 27.86 KG/M2 | HEART RATE: 75 BPM | WEIGHT: 210.2 LBS | SYSTOLIC BLOOD PRESSURE: 138 MMHG

## 2022-09-19 DIAGNOSIS — C83.32 DIFFUSE LARGE B-CELL LYMPHOMA OF INTRATHORACIC LYMPH NODES: ICD-10-CM

## 2022-09-19 DIAGNOSIS — C83.32 DIFFUSE LARGE B-CELL LYMPHOMA OF INTRATHORACIC LYMPH NODES: Primary | ICD-10-CM

## 2022-09-19 LAB
ALBUMIN SERPL-MCNC: 4.6 G/DL (ref 3.5–5.2)
ALBUMIN/GLOB SERPL: 1.7 G/DL
ALP SERPL-CCNC: 78 U/L (ref 39–117)
ALT SERPL W P-5'-P-CCNC: 22 U/L (ref 1–41)
ANION GAP SERPL CALCULATED.3IONS-SCNC: 8.1 MMOL/L (ref 5–15)
AST SERPL-CCNC: 24 U/L (ref 1–40)
BASOPHILS # BLD AUTO: 0.04 10*3/MM3 (ref 0–0.2)
BASOPHILS NFR BLD AUTO: 0.8 % (ref 0–1.5)
BILIRUB SERPL-MCNC: 0.4 MG/DL (ref 0–1.2)
BUN SERPL-MCNC: 11 MG/DL (ref 8–23)
BUN/CREAT SERPL: 10.9 (ref 7–25)
CALCIUM SPEC-SCNC: 9.8 MG/DL (ref 8.6–10.5)
CHLORIDE SERPL-SCNC: 105 MMOL/L (ref 98–107)
CO2 SERPL-SCNC: 29.9 MMOL/L (ref 22–29)
CREAT SERPL-MCNC: 1.01 MG/DL (ref 0.76–1.27)
DEPRECATED RDW RBC AUTO: 42.1 FL (ref 37–54)
EGFRCR SERPLBLD CKD-EPI 2021: 85.1 ML/MIN/1.73
EOSINOPHIL # BLD AUTO: 0.1 10*3/MM3 (ref 0–0.4)
EOSINOPHIL NFR BLD AUTO: 1.9 % (ref 0.3–6.2)
ERYTHROCYTE [DISTWIDTH] IN BLOOD BY AUTOMATED COUNT: 12.1 % (ref 12.3–15.4)
GLOBULIN UR ELPH-MCNC: 2.7 GM/DL
GLUCOSE SERPL-MCNC: 128 MG/DL (ref 65–99)
HCT VFR BLD AUTO: 41.1 % (ref 37.5–51)
HGB BLD-MCNC: 14.2 G/DL (ref 13–17.7)
IMM GRANULOCYTES # BLD AUTO: 0.02 10*3/MM3 (ref 0–0.05)
IMM GRANULOCYTES NFR BLD AUTO: 0.4 % (ref 0–0.5)
LYMPHOCYTES # BLD AUTO: 1.55 10*3/MM3 (ref 0.7–3.1)
LYMPHOCYTES NFR BLD AUTO: 29.6 % (ref 19.6–45.3)
MCH RBC QN AUTO: 32.1 PG (ref 26.6–33)
MCHC RBC AUTO-ENTMCNC: 34.5 G/DL (ref 31.5–35.7)
MCV RBC AUTO: 93 FL (ref 79–97)
MONOCYTES # BLD AUTO: 0.48 10*3/MM3 (ref 0.1–0.9)
MONOCYTES NFR BLD AUTO: 9.2 % (ref 5–12)
NEUTROPHILS NFR BLD AUTO: 3.04 10*3/MM3 (ref 1.7–7)
NEUTROPHILS NFR BLD AUTO: 58.1 % (ref 42.7–76)
NRBC BLD AUTO-RTO: 0 /100 WBC (ref 0–0.2)
PLATELET # BLD AUTO: 228 10*3/MM3 (ref 140–450)
PMV BLD AUTO: 9.7 FL (ref 6–12)
POTASSIUM SERPL-SCNC: 4.3 MMOL/L (ref 3.5–5.2)
PROT SERPL-MCNC: 7.3 G/DL (ref 6–8.5)
RBC # BLD AUTO: 4.42 10*6/MM3 (ref 4.14–5.8)
SODIUM SERPL-SCNC: 143 MMOL/L (ref 136–145)
WBC NRBC COR # BLD: 5.23 10*3/MM3 (ref 3.4–10.8)

## 2022-09-19 PROCEDURE — 99214 OFFICE O/P EST MOD 30 MIN: CPT | Performed by: INTERNAL MEDICINE

## 2022-09-19 PROCEDURE — 36415 COLL VENOUS BLD VENIPUNCTURE: CPT

## 2022-09-19 PROCEDURE — 85025 COMPLETE CBC W/AUTO DIFF WBC: CPT | Performed by: INTERNAL MEDICINE

## 2022-09-19 PROCEDURE — 80053 COMPREHEN METABOLIC PANEL: CPT | Performed by: INTERNAL MEDICINE

## 2023-07-25 ENCOUNTER — TELEPHONE (OUTPATIENT)
Dept: ONCOLOGY | Facility: CLINIC | Age: 63
End: 2023-07-25

## 2023-07-25 NOTE — TELEPHONE ENCOUNTER
Caller: Franklin Da Silva    Relationship to patient: Self    Best call back number: [phone]     Chief complaint: PT WOULD LIKE TO R/S    Type of visit: LAB AND FOLLOW UP    Requested date: 09/14/23 OR 09/15/23     If rescheduling, when is the original appointment: 09/11/23    Additional notes: IF THE 14TH OR 15TH IS NOT AVAILABLE PLEASE LEAVE APPT AS IS.    PLEASE CALL THE PT BACK AND ADVISE IF THE APPT IS ABLE TO BE CHANGED.

## 2023-07-26 ENCOUNTER — TELEPHONE (OUTPATIENT)
Dept: ONCOLOGY | Facility: CLINIC | Age: 63
End: 2023-07-26

## 2023-07-26 NOTE — TELEPHONE ENCOUNTER
Caller: Franklin Da Silva    Relationship to patient: Self    Best call back number: [phone]     Chief complaint: R/S    Type of visit: LAB & FOLLOW UP 1    Requested date: EITHER THE MONDAY BEFORE OR THE MONDAY AFTER     If rescheduling, when is the original appointment: 9/11     Additional notes: PLEASE CALL PT TO RESCHEDULE, WOULD LIKE AN APPT AROUND LUNCHTIME IF POSSIBLE, EITHER THE MONDAY BEFORE OR AFTER CURRENT.

## 2023-09-22 NOTE — PROGRESS NOTES
"Chief Complaint  Primary mediastinal large B cell non-Hodgkin's lymphoma status post CHOP/Rituxan chemotherapy x six cycles, completed January 2005, with subsequent consolidative mediastinal radiation therapy.    Subjective        History of Present Illness  Patient is here today for a 1 year interval follow-up visit.  Patient reports that over the past year he has done quite well.  He did develop COVID-19 infection in December 2022, received Paxlovid and had mild symptoms which resolved completely.  He did not follow-up with cardiology as Dr. Crisostomo is no longer seeing patients in the office.  He did not have his chest x-ray earlier today as planned.  He denies any fevers or night sweats.  He reports normal appetite.  He remains active.  He has no new complaints.      Objective   Vital Signs:   /87   Pulse 86   Temp 98 °F (36.7 °C) (Temporal)   Resp 18   Ht 182.9 cm (72\")   Wt 97.9 kg (215 lb 12.8 oz)   SpO2 97%   BMI 29.27 kg/m²     Physical Exam  Constitutional:       Appearance: He is well-developed.   Eyes:      Conjunctiva/sclera: Conjunctivae normal.   Neck:      Thyroid: No thyromegaly.   Cardiovascular:      Rate and Rhythm: Normal rate and regular rhythm.      Heart sounds: No murmur heard.    No friction rub. No gallop.   Pulmonary:      Effort: No respiratory distress.      Breath sounds: Normal breath sounds.   Abdominal:      General: Bowel sounds are normal. There is no distension.      Palpations: Abdomen is soft.      Tenderness: There is no abdominal tenderness.   Lymphadenopathy:      Head:      Right side of head: No submandibular adenopathy.      Cervical: No cervical adenopathy.      Upper Body:      Right upper body: No supraclavicular adenopathy.      Left upper body: No supraclavicular adenopathy.   Skin:     General: Skin is warm and dry.      Findings: No rash.   Neurological:      Mental Status: He is alert and oriented to person, place, and time.      Cranial Nerves: No " cranial nerve deficit.      Motor: No abnormal muscle tone.      Deep Tendon Reflexes: Reflexes normal.   Psychiatric:         Behavior: Behavior normal.     Patient was examined today, unchanged from above     Result Review : Reviewed CBC, CMP from today       Assessment and Plan    *Primary mediastinal large B cell non-Hodgkin lymphoma:  The patient was diagnosed in 2004 and was treated with CHOP/Rituxan x6 cycles and subsequently received consolidative radiation therapy with treatment completed in early 2005.    He has had no evidence of recurrence.    Due to previous thoracic irradiation, increased risk for premature coronary artery disease, valvular disease, pericardial disease.  He did undergo a stress test sometime around 2012 due to some atypical chest pain and this was a negative study.  He did see cardiology Dr. Crisostomo in 2017 and underwent echocardiogram on 5/1/17 showing an ejection fraction of 53.2% and no valvular abnormalities.    Patient was seen again by cardiology and underwent stress echo on 10/4/2021 which ejection fraction 61%, no significant abnormalities.   Patient was previously scheduled for follow-up with cardiology on 10/24/2022 however was not seen as Dr. Crisostomo was no longer seeing patients in the office.  We have been performing chest x-ray on a every other year basis given risk for secondary malignancies in the setting of prior chest irradiation.  Last chest x-ray performed 8/25/2021 which showed mildly prominent lung markings in the right perihilar region stable since prior study.  Patient is due for chest x-ray today  Patient returns today for an annual follow-up visit.  He has done quite well over the past year.  He has not had any new issues develop.  He was to have undergone chest x-ray today prior to visit however was not performed.  We discussed rationale for intermittent chest x-ray monitoring given his risk of secondary malignancies after prior radiation to chest.  We  will have x-ray performed either today as he leaves the office or within the next week and I will notify him of the result.  We also discussed the need for follow-up with cardiology given his risk of premature coronary artery disease and valvular disease with previous thoracic radiation.  I am referring him back to cardiology to reestablish care.  He will return here again in 1 year for follow-up.  We will plan to continue with every other year chest x-rays.    *Health maintenance:   The patient did undergo a screening colonoscopy in December 2015 with a negative result.  Follow-up will be anticipated at a 10 year interval    *Right-sided sciatica  Patient was experiencing significant right-sided sciatica when seen on 8/23/2021.  Patient did undergo MRI lumbar spine 9/7/2022 that showed degenerative change, disc disease, canal narrowing however no specific areas that appear to require additional evaluation or intervention.  There was a comment regarding heterogeneous marrow signal that was indeterminant.  In terms of the heterogeneous marrow signal, with normal CBC, we are not going to pursue any further evaluation.  Patient reports that he underwent physical therapy and eventually his sciatica resolved.  He has no residual symptoms currently.      Plan:  The patient continues on a course of surveillance/observation.  At this point he is out of timeframe for recurrence of his lymphoma however we continue to monitor for potential late effects from previous thoracic irradiation.  Chest x-ray to be performed today or within the next week and we will notify patient of the result.  We will continue to monitor chest x-ray every other year  Refer patient back to cardiology to reestablish care in regards to previous thoracic radiation and risk for coronary artery disease and valvular heart disease  MD visit in 1 year with CBC, CMP.

## 2023-09-25 ENCOUNTER — OFFICE VISIT (OUTPATIENT)
Dept: ONCOLOGY | Facility: CLINIC | Age: 63
End: 2023-09-25

## 2023-09-25 ENCOUNTER — LAB (OUTPATIENT)
Dept: OTHER | Facility: HOSPITAL | Age: 63
End: 2023-09-25
Payer: COMMERCIAL

## 2023-09-25 VITALS
BODY MASS INDEX: 29.23 KG/M2 | WEIGHT: 215.8 LBS | DIASTOLIC BLOOD PRESSURE: 87 MMHG | HEIGHT: 72 IN | OXYGEN SATURATION: 97 % | HEART RATE: 86 BPM | TEMPERATURE: 98 F | SYSTOLIC BLOOD PRESSURE: 150 MMHG | RESPIRATION RATE: 18 BRPM

## 2023-09-25 DIAGNOSIS — C83.32 DIFFUSE LARGE B-CELL LYMPHOMA OF INTRATHORACIC LYMPH NODES: ICD-10-CM

## 2023-09-25 DIAGNOSIS — C83.32 DIFFUSE LARGE B-CELL LYMPHOMA OF INTRATHORACIC LYMPH NODES: Primary | ICD-10-CM

## 2023-09-25 DIAGNOSIS — Z92.3 HISTORY OF RADIATION THERAPY: ICD-10-CM

## 2023-09-25 LAB
ALBUMIN SERPL-MCNC: 4.6 G/DL (ref 3.5–5.2)
ALBUMIN/GLOB SERPL: 1.9 G/DL
ALP SERPL-CCNC: 79 U/L (ref 39–117)
ALT SERPL W P-5'-P-CCNC: 16 U/L (ref 1–41)
ANION GAP SERPL CALCULATED.3IONS-SCNC: 8.6 MMOL/L (ref 5–15)
AST SERPL-CCNC: 22 U/L (ref 1–40)
BASOPHILS # BLD AUTO: 0.05 10*3/MM3 (ref 0–0.2)
BASOPHILS NFR BLD AUTO: 0.8 % (ref 0–1.5)
BILIRUB SERPL-MCNC: 0.2 MG/DL (ref 0–1.2)
BUN SERPL-MCNC: 18 MG/DL (ref 8–23)
BUN/CREAT SERPL: 15.4 (ref 7–25)
CALCIUM SPEC-SCNC: 9.7 MG/DL (ref 8.6–10.5)
CHLORIDE SERPL-SCNC: 107 MMOL/L (ref 98–107)
CO2 SERPL-SCNC: 27.4 MMOL/L (ref 22–29)
CREAT SERPL-MCNC: 1.17 MG/DL (ref 0.76–1.27)
DEPRECATED RDW RBC AUTO: 42.4 FL (ref 37–54)
EGFRCR SERPLBLD CKD-EPI 2021: 70.9 ML/MIN/1.73
EOSINOPHIL # BLD AUTO: 0.16 10*3/MM3 (ref 0–0.4)
EOSINOPHIL NFR BLD AUTO: 2.7 % (ref 0.3–6.2)
ERYTHROCYTE [DISTWIDTH] IN BLOOD BY AUTOMATED COUNT: 12.1 % (ref 12.3–15.4)
GLOBULIN UR ELPH-MCNC: 2.4 GM/DL
GLUCOSE SERPL-MCNC: 101 MG/DL (ref 65–99)
HCT VFR BLD AUTO: 41.5 % (ref 37.5–51)
HGB BLD-MCNC: 13.8 G/DL (ref 13–17.7)
IMM GRANULOCYTES # BLD AUTO: 0.01 10*3/MM3 (ref 0–0.05)
IMM GRANULOCYTES NFR BLD AUTO: 0.2 % (ref 0–0.5)
LYMPHOCYTES # BLD AUTO: 1.8 10*3/MM3 (ref 0.7–3.1)
LYMPHOCYTES NFR BLD AUTO: 29.9 % (ref 19.6–45.3)
MCH RBC QN AUTO: 31.5 PG (ref 26.6–33)
MCHC RBC AUTO-ENTMCNC: 33.3 G/DL (ref 31.5–35.7)
MCV RBC AUTO: 94.7 FL (ref 79–97)
MONOCYTES # BLD AUTO: 0.57 10*3/MM3 (ref 0.1–0.9)
MONOCYTES NFR BLD AUTO: 9.5 % (ref 5–12)
NEUTROPHILS NFR BLD AUTO: 3.44 10*3/MM3 (ref 1.7–7)
NEUTROPHILS NFR BLD AUTO: 56.9 % (ref 42.7–76)
NRBC BLD AUTO-RTO: 0 /100 WBC (ref 0–0.2)
PLATELET # BLD AUTO: 221 10*3/MM3 (ref 140–450)
PMV BLD AUTO: 10 FL (ref 6–12)
POTASSIUM SERPL-SCNC: 4.3 MMOL/L (ref 3.5–5.2)
PROT SERPL-MCNC: 7 G/DL (ref 6–8.5)
RBC # BLD AUTO: 4.38 10*6/MM3 (ref 4.14–5.8)
SODIUM SERPL-SCNC: 143 MMOL/L (ref 136–145)
WBC NRBC COR # BLD: 6.03 10*3/MM3 (ref 3.4–10.8)

## 2023-09-25 PROCEDURE — 36415 COLL VENOUS BLD VENIPUNCTURE: CPT

## 2023-09-25 PROCEDURE — 99214 OFFICE O/P EST MOD 30 MIN: CPT | Performed by: INTERNAL MEDICINE

## 2023-09-25 PROCEDURE — 80053 COMPREHEN METABOLIC PANEL: CPT | Performed by: INTERNAL MEDICINE

## 2023-09-25 PROCEDURE — 85025 COMPLETE CBC W/AUTO DIFF WBC: CPT | Performed by: INTERNAL MEDICINE

## 2023-09-29 DIAGNOSIS — C83.32 DIFFUSE LARGE B-CELL LYMPHOMA OF INTRATHORACIC LYMPH NODES: Primary | ICD-10-CM

## 2023-10-02 ENCOUNTER — HOSPITAL ENCOUNTER (OUTPATIENT)
Dept: GENERAL RADIOLOGY | Facility: HOSPITAL | Age: 63
Discharge: HOME | End: 2023-10-02
Admitting: NURSE PRACTITIONER
Payer: COMMERCIAL

## 2023-10-02 DIAGNOSIS — C83.32 DIFFUSE LARGE B-CELL LYMPHOMA OF INTRATHORACIC LYMPH NODES: ICD-10-CM

## 2023-10-02 PROCEDURE — 71046 X-RAY EXAM CHEST 2 VIEWS: CPT

## 2023-10-04 ENCOUNTER — TELEPHONE (OUTPATIENT)
Dept: ONCOLOGY | Facility: CLINIC | Age: 63
End: 2023-10-04
Payer: COMMERCIAL

## 2023-10-06 ENCOUNTER — OFFICE VISIT (OUTPATIENT)
Dept: CARDIOLOGY | Facility: CLINIC | Age: 63
End: 2023-10-06
Payer: COMMERCIAL

## 2023-10-06 VITALS
HEART RATE: 71 BPM | DIASTOLIC BLOOD PRESSURE: 82 MMHG | BODY MASS INDEX: 29.26 KG/M2 | WEIGHT: 216 LBS | SYSTOLIC BLOOD PRESSURE: 134 MMHG | HEIGHT: 72 IN | RESPIRATION RATE: 18 BRPM | OXYGEN SATURATION: 97 %

## 2023-10-06 DIAGNOSIS — Z13.6 ENCOUNTER FOR SCREENING FOR CARDIOVASCULAR DISORDERS: Primary | ICD-10-CM

## 2023-10-06 NOTE — PROGRESS NOTES
[facility]      Patient Name: Franklin Da Silva  :1960  Age: 63 y.o.  Encounter Provider:  Zach Osman Jr, MD      Chief Complaint: Follow-up precordial pain and history of extensive radiation therapy for lymphoma      HPI  Franklin Da Silva is a 63 y.o. male past medical history of dyslipidemia and non-Hodgkin's lymphoma for which he has had chemotherapy and extensive radiation in .  He has previously followed with Dr. Crisostomo I reviewed all pertinent electronic health records.  Patient was complaining of precordial pain at last visit in .  A stress echocardiogram was performed showing no evidence of myocardial ischemia and normal baseline echocardiographic features including no significant valvular heart disease.  Patient has fair functional capacity with no chest pain or shortness of air.  No orthopnea, PND or edema.  No palpitations, dizziness syncope.  Family history includes father who has extensive cardiac history including multiple coronary interventions and sick sinus syndrome status post pacemaker implantation although this was all discovered at an advanced age.  No cardiac complaints at time of interview.      The following portions of the patient's history were reviewed and updated as appropriate: allergies, current medications, past family history, past medical history, past social history, past surgical history and problem list.      Review of Systems   Constitutional: Negative for chills and fever.   HENT:  Negative for hoarse voice and sore throat.    Eyes:  Negative for double vision and photophobia.   Cardiovascular:  Negative for chest pain, leg swelling, near-syncope, orthopnea, palpitations, paroxysmal nocturnal dyspnea and syncope.   Respiratory:  Negative for cough and wheezing.    Skin:  Negative for poor wound healing and rash.   Musculoskeletal:  Negative for arthritis and joint swelling.   Gastrointestinal:  Negative for bloating, abdominal pain,  "hematemesis and hematochezia.   Neurological:  Negative for dizziness and focal weakness.   Psychiatric/Behavioral:  Negative for depression and suicidal ideas.      OBJECTIVE:   Vital Signs  Vitals:    10/06/23 0905   BP: 134/82   Pulse: 71   Resp: 18   SpO2: 97%     Estimated body mass index is 29.29 kg/m² as calculated from the following:    Height as of this encounter: 182.9 cm (72\").    Weight as of this encounter: 98 kg (216 lb).    Vitals reviewed.   Constitutional:       Appearance: Healthy appearance. Not in distress.   Neck:      Vascular: No JVR. JVD normal.   Pulmonary:      Effort: Pulmonary effort is normal.      Breath sounds: Normal breath sounds. No wheezing. No rhonchi. No rales.   Chest:      Chest wall: Not tender to palpatation.   Cardiovascular:      PMI at left midclavicular line. Normal rate. Regular rhythm. Normal S1. Normal S2.       Murmurs: There is no murmur.      No gallop.  No click. No rub.   Pulses:     Intact distal pulses.   Edema:     Peripheral edema absent.   Abdominal:      General: Bowel sounds are normal.      Palpations: Abdomen is soft.      Tenderness: There is no abdominal tenderness.   Musculoskeletal: Normal range of motion.         General: No tenderness. Skin:     General: Skin is warm and dry.   Neurological:      General: No focal deficit present.      Mental Status: Alert and oriented to person, place and time.       ECG 12 Lead    Date/Time: 10/6/2023 9:33 AM  Performed by: Zach Osman Jr., MD  Authorized by: Zach Osman Jr., MD   Comparison: compared with previous ECG from 7/28/2020  Similar to previous ECG  Rhythm: sinus rhythm  Other findings: poor R wave progression    Clinical impression: non-specific ECG           BUN   Date Value Ref Range Status   09/25/2023 18 8 - 23 mg/dL Final   08/11/2020 14 7 - 20 mg/dL Final     Creatinine   Date Value Ref Range Status   09/25/2023 1.17 0.76 - 1.27 mg/dL Final   08/11/2020 0.9 0.7 - 1.5 mg/dL Final "     Potassium   Date Value Ref Range Status   09/25/2023 4.3 3.5 - 5.2 mmol/L Final   08/11/2020 4.5 3.5 - 5.4 mmol/L Final     ALT (SGPT)   Date Value Ref Range Status   09/25/2023 16 1 - 41 U/L Final   08/11/2020 27 13 - 69 U/L Final     AST (SGOT)   Date Value Ref Range Status   09/25/2023 22 1 - 40 U/L Final   08/11/2020 34 15 - 46 U/L Final           ASSESSMENT:     63-year-old male presents for routine follow-up and cardiovascular screening examination      PLAN OF CARE:     Encounter for cardiovascular screening -given his extensive thoracic radiation we will need to monitor for coronary artery disease and or valvular heart disease.  Normal clinical exam today.  Normal stress echocardiogram in 2021.  Given his family history we discussed the potential for further screening with coronary calcium score which she would like to have performed.  We will have this done at [facility].  History of thoracic radiation  History of non-Hodgkin's lymphoma -follows with Dr. Zach Nolan    Return to clinic 12-month             Discharge Medications            Accurate as of October 6, 2023  9:31 AM. If you have any questions, ask your nurse or doctor.                Continue These Medications        Instructions Start Date   MULTIVITAMIN & MINERAL PO   Oral, Daily      rosuvastatin 10 MG tablet  Commonly known as: CRESTOR   10 mg, Oral, Daily      Testosterone 1.62 % gel   Take As Directed               Thank you for allowing me to participate in the care of your patient,      Sincerely,   Zach Osman Jr, MD  [facility]  10/06/23  09:31 EDT  \

## 2024-09-27 NOTE — PROGRESS NOTES
"Chief Complaint  Primary mediastinal large B cell non-Hodgkin's lymphoma status post CHOP/Rituxan chemotherapy x six cycles, completed January 2005, with subsequent consolidative mediastinal radiation therapy.    Subjective        History of Present Illness  Patient returns today for 1 year annual follow-up visit.  After his last visit here he did undergo chest x-ray that was negative on 10/2/2023.  We have been performing chest x-ray every other year due to his prior history of thoracic radiation.  He had been referred back to cardiology and was seen by Dr. Osman on 10/6/2023.  He recommended pursuit of CT for cardiac calcium score however the patient lost the address for the scan and did not pursue this further.  He is scheduled back to see Dr. Lopez on 10/11/2024 for follow-up.  He reports following up with urology and underwent a prostatic biopsy with due to elevated PSA around 4 to 5 months ago that was benign.  He reports that his PSA did return to normal thereafter.  He also developed a right vitreous detachment in his 2 small areas of defect in his right visual field which are minor and he is adjusting to.  He notes that he has not been exercising as much recently, would like to lose weight.      Objective   Vital Signs:   /82   Pulse 90   Temp 98.1 °F (36.7 °C) (Oral)   Resp 16   Ht 182.9 cm (72.01\")   Wt 96.3 kg (212 lb 4.8 oz)   SpO2 98%   BMI 28.79 kg/m²     Physical Exam  Constitutional:       Appearance: He is well-developed.   Eyes:      Conjunctiva/sclera: Conjunctivae normal.   Neck:      Thyroid: No thyromegaly.   Cardiovascular:      Rate and Rhythm: Normal rate and regular rhythm.      Heart sounds: No murmur heard.     No friction rub. No gallop.   Pulmonary:      Effort: No respiratory distress.      Breath sounds: Normal breath sounds.   Abdominal:      General: Bowel sounds are normal. There is no distension.      Palpations: Abdomen is soft.      Tenderness: There is no " abdominal tenderness.   Lymphadenopathy:      Head:      Right side of head: No submandibular adenopathy.      Cervical: No cervical adenopathy.      Upper Body:      Right upper body: No supraclavicular adenopathy.      Left upper body: No supraclavicular adenopathy.   Skin:     General: Skin is warm and dry.      Findings: No rash.   Neurological:      Mental Status: He is alert and oriented to person, place, and time.      Cranial Nerves: No cranial nerve deficit.      Motor: No abnormal muscle tone.      Deep Tendon Reflexes: Reflexes normal.   Psychiatric:         Behavior: Behavior normal.       Patient was examined today, unchanged from above     Result Review : Reviewed CBC, CMP from today.  Chest x-ray from 10/2/2024.  Reviewed cardiology records.       Assessment and Plan    *Primary mediastinal large B cell non-Hodgkin lymphoma:  The patient was diagnosed in 2004 and was treated with CHOP/Rituxan x6 cycles and subsequently received consolidative radiation therapy with treatment completed in early 2005.    He has had no evidence of recurrence.    Due to previous thoracic irradiation, increased risk for premature coronary artery disease, valvular disease, pericardial disease.  He did undergo a stress test sometime around 2012 due to some atypical chest pain and this was a negative study.  He did see cardiology Dr. Crisostomo in 2017 and underwent echocardiogram on 5/1/17 showing an ejection fraction of 53.2% and no valvular abnormalities.    Patient was seen again by cardiology and underwent stress echo on 10/4/2021 which ejection fraction 61%, no significant abnormalities.   Patient did follow-up with Dr. Lopez on 10/6/2023.  Recommended pursuit of CT for cardiac calcium score.  Patient however did not follow through with scan as he lost the address.  He is following up again with Dr. Lopez on 10/11/2024 and we will discuss rescheduling the cardiac calcium CT.  We have been performing chest x-ray on  a every other year basis given risk for secondary malignancies in the setting of prior chest irradiation.  Chest x-ray performed on 10/2/2023, negative.  Patient returns today for an annual follow-up visit.  He continues to do quite well overall.  He did not follow through with the cardiac calcium CT score after his last visit with cardiology.  He is due again for follow-up on 10/11/2024 and we will discuss further recommendations for evaluation at that time.  I will see him back in 1 year to follow-up with repeat chest x-ray at that time    *Health maintenance:   The patient did undergo a screening colonoscopy in December 2015 with a negative result.  Follow-up will be anticipated at a 10 year interval    *Right-sided sciatica  Patient was experiencing significant right-sided sciatica when seen on 8/23/2021.  Patient did undergo MRI lumbar spine 9/7/2022 that showed degenerative change, disc disease, canal narrowing however no specific areas that appear to require additional evaluation or intervention.  There was a comment regarding heterogeneous marrow signal that was indeterminant.  In terms of the heterogeneous marrow signal, with normal CBC, we are not going to pursue any further evaluation.  Patient reports that he underwent physical therapy and eventually his sciatica resolved.  He has no residual symptoms currently.    *Hypogonadism  Patient continues on testosterone replacement with testosterone gel.    *Elevated PSA  Patient reports that he had an elevated PSA and underwent a prostatic biopsy around 4 to 5 months ago that was benign.  He reports that his PSA did return to normal thereafter.  We will obtain records      Plan:  The patient continues on a course of surveillance/observation.  At this point he is out of timeframe for recurrence of his lymphoma however we continue to monitor for potential late effects from previous thoracic irradiation.  Patient will follow-up with cardiology as scheduled on  10/11/2024.  He did not undergo CT for cardiac calcium score as previously requested.  He will need to be rescheduled for this.  We will obtain records from urology regarding recent prostatic biopsy  MD visit in 1 year with CBC, CMP and chest x-ray same day (been performed every other year)

## 2024-09-30 ENCOUNTER — OFFICE VISIT (OUTPATIENT)
Dept: ONCOLOGY | Facility: CLINIC | Age: 64
End: 2024-09-30
Payer: COMMERCIAL

## 2024-09-30 ENCOUNTER — LAB (OUTPATIENT)
Dept: OTHER | Facility: HOSPITAL | Age: 64
End: 2024-09-30
Payer: COMMERCIAL

## 2024-09-30 VITALS
BODY MASS INDEX: 28.76 KG/M2 | SYSTOLIC BLOOD PRESSURE: 132 MMHG | OXYGEN SATURATION: 98 % | WEIGHT: 212.3 LBS | DIASTOLIC BLOOD PRESSURE: 82 MMHG | RESPIRATION RATE: 16 BRPM | HEIGHT: 72 IN | TEMPERATURE: 98.1 F | HEART RATE: 90 BPM

## 2024-09-30 DIAGNOSIS — Z92.3 HISTORY OF RADIATION THERAPY: ICD-10-CM

## 2024-09-30 DIAGNOSIS — C83.32 DIFFUSE LARGE B-CELL LYMPHOMA OF INTRATHORACIC LYMPH NODES: Primary | ICD-10-CM

## 2024-09-30 DIAGNOSIS — D75.89 MACROCYTOSIS WITHOUT ANEMIA: ICD-10-CM

## 2024-09-30 DIAGNOSIS — C83.32 DIFFUSE LARGE B-CELL LYMPHOMA OF INTRATHORACIC LYMPH NODES: ICD-10-CM

## 2024-09-30 LAB
ALBUMIN SERPL-MCNC: 4.3 G/DL (ref 3.5–5.2)
ALBUMIN/GLOB SERPL: 1.4 G/DL
ALP SERPL-CCNC: 87 U/L (ref 39–117)
ALT SERPL W P-5'-P-CCNC: 22 U/L (ref 1–41)
ANION GAP SERPL CALCULATED.3IONS-SCNC: 8.3 MMOL/L (ref 5–15)
AST SERPL-CCNC: 23 U/L (ref 1–40)
BASOPHILS # BLD AUTO: 0.04 10*3/MM3 (ref 0–0.2)
BASOPHILS NFR BLD AUTO: 0.6 % (ref 0–1.5)
BILIRUB SERPL-MCNC: 0.3 MG/DL (ref 0–1.2)
BUN SERPL-MCNC: 15 MG/DL (ref 8–23)
BUN/CREAT SERPL: 11.8 (ref 7–25)
CALCIUM SPEC-SCNC: 9.8 MG/DL (ref 8.6–10.5)
CHLORIDE SERPL-SCNC: 105 MMOL/L (ref 98–107)
CO2 SERPL-SCNC: 27.7 MMOL/L (ref 22–29)
CREAT SERPL-MCNC: 1.27 MG/DL (ref 0.76–1.27)
DEPRECATED RDW RBC AUTO: 42.1 FL (ref 37–54)
EGFRCR SERPLBLD CKD-EPI 2021: 63.9 ML/MIN/1.73
EOSINOPHIL # BLD AUTO: 0.07 10*3/MM3 (ref 0–0.4)
EOSINOPHIL NFR BLD AUTO: 1 % (ref 0.3–6.2)
ERYTHROCYTE [DISTWIDTH] IN BLOOD BY AUTOMATED COUNT: 12.1 % (ref 12.3–15.4)
GLOBULIN UR ELPH-MCNC: 3.1 GM/DL
GLUCOSE SERPL-MCNC: 94 MG/DL (ref 65–99)
HCT VFR BLD AUTO: 42.3 % (ref 37.5–51)
HGB BLD-MCNC: 14.3 G/DL (ref 13–17.7)
IMM GRANULOCYTES # BLD AUTO: 0.01 10*3/MM3 (ref 0–0.05)
IMM GRANULOCYTES NFR BLD AUTO: 0.1 % (ref 0–0.5)
LYMPHOCYTES # BLD AUTO: 1.73 10*3/MM3 (ref 0.7–3.1)
LYMPHOCYTES NFR BLD AUTO: 25.6 % (ref 19.6–45.3)
MCH RBC QN AUTO: 31.6 PG (ref 26.6–33)
MCHC RBC AUTO-ENTMCNC: 33.8 G/DL (ref 31.5–35.7)
MCV RBC AUTO: 93.6 FL (ref 79–97)
MONOCYTES # BLD AUTO: 0.62 10*3/MM3 (ref 0.1–0.9)
MONOCYTES NFR BLD AUTO: 9.2 % (ref 5–12)
NEUTROPHILS NFR BLD AUTO: 4.29 10*3/MM3 (ref 1.7–7)
NEUTROPHILS NFR BLD AUTO: 63.5 % (ref 42.7–76)
NRBC BLD AUTO-RTO: 0 /100 WBC (ref 0–0.2)
PLATELET # BLD AUTO: 247 10*3/MM3 (ref 140–450)
PMV BLD AUTO: 9.8 FL (ref 6–12)
POTASSIUM SERPL-SCNC: 4 MMOL/L (ref 3.5–5.2)
PROT SERPL-MCNC: 7.4 G/DL (ref 6–8.5)
RBC # BLD AUTO: 4.52 10*6/MM3 (ref 4.14–5.8)
SODIUM SERPL-SCNC: 141 MMOL/L (ref 136–145)
WBC NRBC COR # BLD AUTO: 6.76 10*3/MM3 (ref 3.4–10.8)

## 2024-09-30 PROCEDURE — 85025 COMPLETE CBC W/AUTO DIFF WBC: CPT | Performed by: INTERNAL MEDICINE

## 2024-09-30 PROCEDURE — 99214 OFFICE O/P EST MOD 30 MIN: CPT | Performed by: INTERNAL MEDICINE

## 2024-09-30 PROCEDURE — 80053 COMPREHEN METABOLIC PANEL: CPT | Performed by: INTERNAL MEDICINE

## 2024-09-30 PROCEDURE — 36415 COLL VENOUS BLD VENIPUNCTURE: CPT

## 2024-09-30 NOTE — LETTER
"September 30, 2024       No Recipients    Patient: Franklin Da Silva   YOB: 1960   Date of Visit: 9/30/2024     Dear Lebron Claros MD:       Thank you for referring Franklin Da Silva to me for evaluation. Below are the relevant portions of my assessment and plan of care.    If you have questions, please do not hesitate to call me. I look forward to following Franklin along with you.         Sincerely,        Zach Nolan MD        CC:   No Recipients    Zach Nolan Jr., MD  09/30/24 2136  Sign when Signing Visit  Chief Complaint  Primary mediastinal large B cell non-Hodgkin's lymphoma status post CHOP/Rituxan chemotherapy x six cycles, completed January 2005, with subsequent consolidative mediastinal radiation therapy.    Subjective       History of Present Illness  Patient returns today for 1 year annual follow-up visit.  After his last visit here he did undergo chest x-ray that was negative on 10/2/2023.  We have been performing chest x-ray every other year due to his prior history of thoracic radiation.  He had been referred back to cardiology and was seen by Dr. Osman on 10/6/2023.  He recommended pursuit of CT for cardiac calcium score however the patient lost the address for the scan and did not pursue this further.  He is scheduled back to see Dr. Lopez on 10/11/2024 for follow-up.  He reports following up with urology and underwent a prostatic biopsy with due to elevated PSA around 4 to 5 months ago that was benign.  He reports that his PSA did return to normal thereafter.  He also developed a right vitreous detachment in his 2 small areas of defect in his right visual field which are minor and he is adjusting to.  He notes that he has not been exercising as much recently, would like to lose weight.      Objective  Vital Signs:   /82   Pulse 90   Temp 98.1 °F (36.7 °C) (Oral)   Resp 16   Ht 182.9 cm (72.01\")   Wt 96.3 kg (212 lb 4.8 oz)   SpO2 98%   BMI 28.79 kg/m²   "   Physical Exam  Constitutional:       Appearance: He is well-developed.   Eyes:      Conjunctiva/sclera: Conjunctivae normal.   Neck:      Thyroid: No thyromegaly.   Cardiovascular:      Rate and Rhythm: Normal rate and regular rhythm.      Heart sounds: No murmur heard.     No friction rub. No gallop.   Pulmonary:      Effort: No respiratory distress.      Breath sounds: Normal breath sounds.   Abdominal:      General: Bowel sounds are normal. There is no distension.      Palpations: Abdomen is soft.      Tenderness: There is no abdominal tenderness.   Lymphadenopathy:      Head:      Right side of head: No submandibular adenopathy.      Cervical: No cervical adenopathy.      Upper Body:      Right upper body: No supraclavicular adenopathy.      Left upper body: No supraclavicular adenopathy.   Skin:     General: Skin is warm and dry.      Findings: No rash.   Neurological:      Mental Status: He is alert and oriented to person, place, and time.      Cranial Nerves: No cranial nerve deficit.      Motor: No abnormal muscle tone.      Deep Tendon Reflexes: Reflexes normal.   Psychiatric:         Behavior: Behavior normal.       Patient was examined today, unchanged from above     Result Review: Reviewed CBC, CMP from today.  Chest x-ray from 10/2/2024.  Reviewed cardiology records.       Assessment and Plan    *Primary mediastinal large B cell non-Hodgkin lymphoma:  The patient was diagnosed in 2004 and was treated with CHOP/Rituxan x6 cycles and subsequently received consolidative radiation therapy with treatment completed in early 2005.    He has had no evidence of recurrence.    Due to previous thoracic irradiation, increased risk for premature coronary artery disease, valvular disease, pericardial disease.  He did undergo a stress test sometime around 2012 due to some atypical chest pain and this was a negative study.  He did see cardiology Dr. Crisostomo in 2017 and underwent echocardiogram on 5/1/17 showing an  ejection fraction of 53.2% and no valvular abnormalities.    Patient was seen again by cardiology and underwent stress echo on 10/4/2021 which ejection fraction 61%, no significant abnormalities.   Patient did follow-up with Dr. Lopez on 10/6/2023.  Recommended pursuit of CT for cardiac calcium score.  Patient however did not follow through with scan as he lost the address.  He is following up again with Dr. Lopez on 10/11/2024 and we will discuss rescheduling the cardiac calcium CT.  We have been performing chest x-ray on a every other year basis given risk for secondary malignancies in the setting of prior chest irradiation.  Chest x-ray performed on 10/2/2023, negative.  Patient returns today for an annual follow-up visit.  He continues to do quite well overall.  He did not follow through with the cardiac calcium CT score after his last visit with cardiology.  He is due again for follow-up on 10/11/2024 and we will discuss further recommendations for evaluation at that time.  I will see him back in 1 year to follow-up with repeat chest x-ray at that time    *Health maintenance:   The patient did undergo a screening colonoscopy in December 2015 with a negative result.  Follow-up will be anticipated at a 10 year interval    *Right-sided sciatica  Patient was experiencing significant right-sided sciatica when seen on 8/23/2021.  Patient did undergo MRI lumbar spine 9/7/2022 that showed degenerative change, disc disease, canal narrowing however no specific areas that appear to require additional evaluation or intervention.  There was a comment regarding heterogeneous marrow signal that was indeterminant.  In terms of the heterogeneous marrow signal, with normal CBC, we are not going to pursue any further evaluation.  Patient reports that he underwent physical therapy and eventually his sciatica resolved.  He has no residual symptoms currently.    *Hypogonadism  Patient continues on testosterone replacement with  testosterone gel.    *Elevated PSA  Patient reports that he had an elevated PSA and underwent a prostatic biopsy around 4 to 5 months ago that was benign.  He reports that his PSA did return to normal thereafter.  We will obtain records      Plan:  The patient continues on a course of surveillance/observation.  At this point he is out of timeframe for recurrence of his lymphoma however we continue to monitor for potential late effects from previous thoracic irradiation.  Patient will follow-up with cardiology as scheduled on 10/11/2024.  He did not undergo CT for cardiac calcium score as previously requested.  He will need to be rescheduled for this.  We will obtain records from urology regarding recent prostatic biopsy  MD visit in 1 year with CBC, CMP and chest x-ray same day (been performed every other year)

## 2024-10-11 ENCOUNTER — OFFICE VISIT (OUTPATIENT)
Dept: CARDIOLOGY | Facility: CLINIC | Age: 64
End: 2024-10-11
Payer: COMMERCIAL

## 2024-10-11 VITALS
BODY MASS INDEX: 28.71 KG/M2 | SYSTOLIC BLOOD PRESSURE: 130 MMHG | HEIGHT: 72 IN | DIASTOLIC BLOOD PRESSURE: 80 MMHG | HEART RATE: 72 BPM | OXYGEN SATURATION: 98 % | WEIGHT: 212 LBS | RESPIRATION RATE: 16 BRPM

## 2024-10-11 DIAGNOSIS — Z13.6 ENCOUNTER FOR SCREENING FOR CARDIOVASCULAR DISORDERS: Primary | ICD-10-CM

## 2024-10-11 RX ORDER — AMOXICILLIN 500 MG/1
500 CAPSULE ORAL 2 TIMES DAILY
COMMUNITY
Start: 2024-09-05

## 2024-10-11 NOTE — PROGRESS NOTES
[facility]      Patient Name: Franklin Da Silva  :1960  Age: 64 y.o.  Encounter Provider:  Zach Osman Jr, MD      Chief Complaint: Follow-up precordial pain and history of extensive radiation therapy for lymphoma      HPI  Franklin Da Silva is a 64 y.o. male past medical history of dyslipidemia and non-Hodgkin's lymphoma for which he has had chemotherapy and extensive radiation in .     Last clinic visit note: He has previously followed with Dr. Crisostomo I reviewed all pertinent electronic health records.  Patient was complaining of precordial pain at last visit in .  A stress echocardiogram was performed showing no evidence of myocardial ischemia and normal baseline echocardiographic features including no significant valvular heart disease.  Patient has fair functional capacity with no chest pain or shortness of air.  No orthopnea, PND or edema.  No palpitations, dizziness syncope.  Family history includes father who has extensive cardiac history including multiple coronary interventions and sick sinus syndrome status post pacemaker implantation although this was all discovered at an advanced age.  No cardiac complaints at time of interview.    He has not yet had the calcium score performed.  He is doing well with physical activity and denies chest pain or shortness of air.  No orthopnea, PND or edema.  No palpitations, dizziness or syncope.  No cardiac complaints at time of interview.    The following portions of the patient's history were reviewed and updated as appropriate: allergies, current medications, past family history, past medical history, past social history, past surgical history and problem list.      Review of Systems   Constitutional: Negative for chills and fever.   HENT:  Negative for hoarse voice and sore throat.    Eyes:  Negative for double vision and photophobia.   Cardiovascular:  Negative for chest pain, leg swelling, near-syncope, orthopnea,  "palpitations, paroxysmal nocturnal dyspnea and syncope.   Respiratory:  Negative for cough and wheezing.    Skin:  Negative for poor wound healing and rash.   Musculoskeletal:  Negative for arthritis and joint swelling.   Gastrointestinal:  Negative for bloating, abdominal pain, hematemesis and hematochezia.   Neurological:  Negative for dizziness and focal weakness.   Psychiatric/Behavioral:  Negative for depression and suicidal ideas.        OBJECTIVE:   Vital Signs  Vitals:    10/11/24 1224   BP: 130/80   Pulse: 72   Resp: 16   SpO2: 98%     Estimated body mass index is 28.75 kg/m² as calculated from the following:    Height as of this encounter: 182.9 cm (72\").    Weight as of this encounter: 96.2 kg (212 lb).    Vitals reviewed.   Constitutional:       Appearance: Healthy appearance. Not in distress.   Neck:      Vascular: No JVR. JVD normal.   Pulmonary:      Effort: Pulmonary effort is normal.      Breath sounds: Normal breath sounds. No wheezing. No rhonchi. No rales.   Chest:      Chest wall: Not tender to palpatation.   Cardiovascular:      PMI at left midclavicular line. Normal rate. Regular rhythm. Normal S1. Normal S2.       Murmurs: There is no murmur.      No gallop.  No click. No rub.   Pulses:     Intact distal pulses.   Edema:     Peripheral edema absent.   Abdominal:      General: Bowel sounds are normal.      Palpations: Abdomen is soft.      Tenderness: There is no abdominal tenderness.   Musculoskeletal: Normal range of motion.         General: No tenderness. Skin:     General: Skin is warm and dry.   Neurological:      General: No focal deficit present.      Mental Status: Alert and oriented to person, place and time.       Procedures       BUN   Date Value Ref Range Status   09/30/2024 15 8 - 23 mg/dL Final   08/12/2022 15 8 - 26 mg/dL Final     Creatinine   Date Value Ref Range Status   09/30/2024 1.27 0.76 - 1.27 mg/dL Final   08/12/2022 0.99 0.73 - 1.18 mg/dL Final     Potassium   Date " Value Ref Range Status   09/30/2024 4.0 3.5 - 5.2 mmol/L Final   08/12/2022 4.2 3.5 - 5.1 mmol/L Final     ALT (SGPT)   Date Value Ref Range Status   09/30/2024 22 1 - 41 U/L Final   08/12/2022 22 10 - 50 U/L Final     AST (SGOT)   Date Value Ref Range Status   09/30/2024 23 1 - 40 U/L Final   08/12/2022 27 10 - 50 U/L Final           ASSESSMENT:     64-year-old male presents for routine follow-up and cardiovascular screening examination      PLAN OF CARE:     Encounter for cardiovascular screening -given his extensive thoracic radiation we will need to monitor for coronary artery disease and or valvular heart disease.  Normal clinical exam today.  Normal stress echocardiogram in 2021.  Given his family history we discussed the potential for further screening with coronary calcium score which she would like to have performed.  Order placed for procedure to be performed at [facility].  History of thoracic radiation  History of non-Hodgkin's lymphoma -follows with Dr. Zach Nolan    Return to clinic 12-month             Discharge Medications            Accurate as of October 11, 2024 12:37 PM. If you have any questions, ask your nurse or doctor.                Continue These Medications        Instructions Start Date   amoxicillin 500 MG capsule  Commonly known as: AMOXIL   500 mg, 2 Times Daily      MULTIVITAMIN & MINERAL PO   Oral, Daily      rosuvastatin 10 MG tablet  Commonly known as: CRESTOR   10 mg, Oral, Daily      Testosterone 1.62 % gel   Take As Directed               Thank you for allowing me to participate in the care of your patient,      Sincerely,   Zach Osman Jr, MD  [facility]  10/11/24  12:37 EDT  \

## 2024-11-08 ENCOUNTER — HOSPITAL ENCOUNTER (OUTPATIENT)
Dept: CT IMAGING | Facility: HOSPITAL | Age: 64
Discharge: HOME | End: 2024-11-08
Admitting: INTERNAL MEDICINE

## 2024-11-08 DIAGNOSIS — Z13.6 ENCOUNTER FOR SCREENING FOR CARDIOVASCULAR DISORDERS: ICD-10-CM

## 2024-11-08 PROCEDURE — 75571 CT HRT W/O DYE W/CA TEST: CPT

## 2024-11-11 ENCOUNTER — TELEPHONE (OUTPATIENT)
Dept: CARDIOLOGY | Facility: CLINIC | Age: 64
End: 2024-11-11
Payer: COMMERCIAL

## 2024-11-11 RX ORDER — ASPIRIN 81 MG/1
81 TABLET ORAL DAILY
Qty: 90 TABLET | Refills: 3 | Status: SHIPPED | OUTPATIENT
Start: 2024-11-11

## 2024-11-11 NOTE — TELEPHONE ENCOUNTER
Spoke to patient about elevated calcium score.  Asymptomatic.  Add aspirin.  Routine follow-up.   [FreeTextEntry1] : \par